# Patient Record
Sex: MALE | Race: WHITE | NOT HISPANIC OR LATINO | Employment: FULL TIME | URBAN - METROPOLITAN AREA
[De-identification: names, ages, dates, MRNs, and addresses within clinical notes are randomized per-mention and may not be internally consistent; named-entity substitution may affect disease eponyms.]

---

## 2017-02-13 ENCOUNTER — OFFICE VISIT (OUTPATIENT)
Dept: FAMILY MEDICINE CLINIC | Facility: CLINIC | Age: 42
End: 2017-02-13

## 2017-02-13 VITALS
SYSTOLIC BLOOD PRESSURE: 120 MMHG | BODY MASS INDEX: 27.49 KG/M2 | RESPIRATION RATE: 16 BRPM | WEIGHT: 192 LBS | DIASTOLIC BLOOD PRESSURE: 80 MMHG | HEIGHT: 70 IN | HEART RATE: 67 BPM | TEMPERATURE: 97.5 F | OXYGEN SATURATION: 97 %

## 2017-02-13 DIAGNOSIS — K21.9 GASTROESOPHAGEAL REFLUX DISEASE WITHOUT ESOPHAGITIS: ICD-10-CM

## 2017-02-13 DIAGNOSIS — M25.561 CHRONIC PAIN OF RIGHT KNEE: ICD-10-CM

## 2017-02-13 DIAGNOSIS — R79.89 LFT ELEVATION: ICD-10-CM

## 2017-02-13 DIAGNOSIS — G89.29 CHRONIC PAIN OF RIGHT KNEE: ICD-10-CM

## 2017-02-13 DIAGNOSIS — R73.01 IMPAIRED FASTING BLOOD SUGAR: ICD-10-CM

## 2017-02-13 DIAGNOSIS — F41.1 GENERALIZED ANXIETY DISORDER: Primary | ICD-10-CM

## 2017-02-13 LAB
ALBUMIN SERPL-MCNC: 4.7 G/DL (ref 3.5–5.2)
ALBUMIN/GLOB SERPL: 1.7 G/DL
ALP SERPL-CCNC: 50 U/L (ref 39–117)
ALT SERPL-CCNC: 47 U/L (ref 1–41)
AST SERPL-CCNC: 31 U/L (ref 1–40)
BILIRUB SERPL-MCNC: 0.6 MG/DL (ref 0.1–1.2)
BUN SERPL-MCNC: 18 MG/DL (ref 6–20)
BUN/CREAT SERPL: 20 (ref 7–25)
CALCIUM SERPL-MCNC: 10 MG/DL (ref 8.6–10.5)
CHLORIDE SERPL-SCNC: 98 MMOL/L (ref 98–107)
CO2 SERPL-SCNC: 27.5 MMOL/L (ref 22–29)
CREAT SERPL-MCNC: 0.9 MG/DL (ref 0.76–1.27)
GLOBULIN SER CALC-MCNC: 2.8 GM/DL
GLUCOSE SERPL-MCNC: 103 MG/DL (ref 65–99)
POTASSIUM SERPL-SCNC: 4.5 MMOL/L (ref 3.5–5.2)
PROT SERPL-MCNC: 7.5 G/DL (ref 6–8.5)
SODIUM SERPL-SCNC: 138 MMOL/L (ref 136–145)

## 2017-02-13 PROCEDURE — 99214 OFFICE O/P EST MOD 30 MIN: CPT | Performed by: PHYSICIAN ASSISTANT

## 2017-02-13 RX ORDER — MELOXICAM 15 MG/1
TABLET ORAL
Refills: 0 | COMMUNITY
Start: 2016-12-29 | End: 2017-02-13

## 2017-02-13 RX ORDER — CELECOXIB 200 MG/1
200 CAPSULE ORAL DAILY
Qty: 30 CAPSULE | Refills: 11 | Status: SHIPPED | OUTPATIENT
Start: 2017-02-13 | End: 2017-03-17

## 2017-02-13 RX ORDER — LANSOPRAZOLE 15 MG/1
15 CAPSULE, DELAYED RELEASE ORAL DAILY
COMMUNITY
End: 2017-12-27

## 2017-02-13 RX ORDER — ALPRAZOLAM 0.25 MG/1
0.25 TABLET ORAL 3 TIMES DAILY PRN
Qty: 30 TABLET | Refills: 2
Start: 2017-02-13 | End: 2019-04-02

## 2017-02-13 NOTE — PROGRESS NOTES
Subjective   Esa Lozano is a 41 y.o. male.     History of Present Illness   Esa Lozano male 41 y.o. who presents today for follow up of Anxiety and Panic Attacks.  He reports medication is working well, patient desires to continue on Rx, and needs refill. Onset of symptoms was approximately several years ago.  He denies current suicidal and homicidal ideation. Risk factors are lifestyle of multiple roles.  Previous treatment includes current Rx.  He complains of the following medication side effects: none.  The patient has had no previous counseling..  The patient has read and signed the [facility] Controlled Substance Contract.  I will continue to see patient for regular follow up appointments.  They are well controlled on their medication.  LARY has been reviewed by me and is updated every 3 months. The patient is aware of the potential for addiction and dependence.  Patient denies having a history of substance abuse or addiction to alcohol.  He does however, have a family history of addiction with his father.    He is intolerant to SSRIs.  I had him on Zoloft  He is on PPI and working    Maxalt does treat migraines.  Has 3-4 migraines a month and this is very effective.    He takes Mobic for right knee and ankle pain.  Stop NSAID if GI upset or any signs tarry or blood in stools  Long term use of NSAIDs can lead to heart disease and strokes, as well as GI bleeding/ulcers, and cause kidney disease.     I will need update labs.    Lab Results   Component Value Date    GLUCOSE 110 (H) 08/20/2015    BUN 18 10/05/2016    CREATININE 1.12 10/05/2016    EGFRIFNONA 73 10/05/2016    EGFRIFAFRI 88 10/05/2016    BCR 16.1 10/05/2016    CO2 28.4 10/05/2016    CALCIUM 10.2 10/05/2016    PROTENTOTREF 7.4 10/05/2016    ALBUMIN 4.90 10/05/2016    LABIL2 2.0 10/05/2016    AST 37 10/05/2016    ALT 56 (H) 10/05/2016     I will need to show renal functions.  He wants to try Celebrex d/t hx GI.  I will repeat LFTs  also.    Lipids were done 4 mos ago and no Rx.    The following portions of the patient's history were reviewed and updated as appropriate: allergies, current medications, past family history, past medical history, past social history, past surgical history and problem list.    Review of Systems   Constitutional: Negative for activity change, appetite change and unexpected weight change.   HENT: Negative for nosebleeds and trouble swallowing.    Eyes: Negative for pain and visual disturbance.   Respiratory: Negative for chest tightness, shortness of breath and wheezing.    Cardiovascular: Negative for chest pain and palpitations.   Gastrointestinal: Negative for abdominal pain and blood in stool.   Endocrine: Negative.    Genitourinary: Negative for difficulty urinating and hematuria.   Musculoskeletal: Negative for joint swelling.   Skin: Negative for color change and rash.   Allergic/Immunologic: Negative.    Neurological: Positive for headaches. Negative for syncope and speech difficulty.   Hematological: Negative for adenopathy.   Psychiatric/Behavioral: Negative for agitation and confusion. The patient is nervous/anxious.    All other systems reviewed and are negative.      Objective   Physical Exam   Constitutional: He is oriented to person, place, and time. He appears well-developed and well-nourished. No distress.   HENT:   Head: Normocephalic and atraumatic.   Eyes: Conjunctivae and EOM are normal. Pupils are equal, round, and reactive to light. Right eye exhibits no discharge. Left eye exhibits no discharge. No scleral icterus.   Neck: Normal range of motion. Neck supple. No tracheal deviation present. No thyromegaly present.   Cardiovascular: Normal rate, regular rhythm, normal heart sounds, intact distal pulses and normal pulses.  Exam reveals no gallop.    No murmur heard.  Pulmonary/Chest: Effort normal and breath sounds normal. No respiratory distress. He has no wheezes. He has no rales.    Musculoskeletal: Normal range of motion.   Lymphadenopathy:     He has no cervical adenopathy.   Neurological: He is alert and oriented to person, place, and time. He exhibits normal muscle tone. Coordination normal.   Skin: Skin is warm. No rash noted. No erythema. No pallor.   Psychiatric: He has a normal mood and affect. His behavior is normal. Judgment and thought content normal.   Nursing note and vitals reviewed.      Assessment/Plan   Problems Addressed this Visit        Digestive    GERD (gastroesophageal reflux disease)    Relevant Medications    lansoprazole (PREVACID) 15 MG capsule    Other Relevant Orders    Comprehensive Metabolic Panel       Musculoskeletal and Integument    Chronic pain of right knee    Relevant Orders    Comprehensive Metabolic Panel       Other    Generalized anxiety disorder - Primary    Relevant Orders    Comprehensive Metabolic Panel

## 2017-02-13 NOTE — PATIENT INSTRUCTIONS
Xanax is a controlled substance.  I only want you to take it as needed.  I do not want you to take it routinely.  Use the lowest effective dose.  It can be habit forming.  It can make you feel drowsy.   This could effect how you operate machinery, including a car.  Caution is advised.  I would avoid taking it and then driving.  Do not take this with alcohol.  Low glycemic index diet  Exercise 30 minutes most days of the week  Make sure you get results on any labs or tests we ordered today  We discussed medications and how to take them as prescribed  Sleep 6-8 hours each night if possible  If you have not signed up for [place] [facility], please activate your code ASAP from your After Visit Summary today    LDL goal <100  LDL goal if heart disease <70  HDL goal >60  Triglyceride goal <150  BP goal =<130/80  Fasting glucose <100    Stop NSAID if GI upset or any signs tarry or blood in stools

## 2017-02-14 ENCOUNTER — TELEPHONE (OUTPATIENT)
Dept: FAMILY MEDICINE CLINIC | Facility: CLINIC | Age: 42
End: 2017-02-14

## 2017-03-17 ENCOUNTER — TELEPHONE (OUTPATIENT)
Dept: FAMILY MEDICINE CLINIC | Facility: CLINIC | Age: 42
End: 2017-03-17

## 2017-03-17 RX ORDER — MELOXICAM 15 MG/1
15 TABLET ORAL DAILY
Qty: 30 TABLET | Refills: 5 | Status: SHIPPED | OUTPATIENT
Start: 2017-03-17 | End: 2017-04-17 | Stop reason: SDUPTHER

## 2017-03-17 NOTE — TELEPHONE ENCOUNTER
Pt states that the celebrex is not helping the pain like the mobic. He would like to go back on the mobic.

## 2017-04-17 RX ORDER — MELOXICAM 15 MG/1
TABLET ORAL
Qty: 90 TABLET | Refills: 0 | Status: SHIPPED | OUTPATIENT
Start: 2017-04-17 | End: 2017-07-19 | Stop reason: SDUPTHER

## 2017-05-14 ENCOUNTER — RESULTS ENCOUNTER (OUTPATIENT)
Dept: FAMILY MEDICINE CLINIC | Facility: CLINIC | Age: 42
End: 2017-05-14

## 2017-05-14 DIAGNOSIS — R73.01 IMPAIRED FASTING BLOOD SUGAR: ICD-10-CM

## 2017-05-14 DIAGNOSIS — R79.89 LFT ELEVATION: ICD-10-CM

## 2017-07-19 RX ORDER — MELOXICAM 15 MG/1
TABLET ORAL
Qty: 90 TABLET | Refills: 0 | Status: SHIPPED | OUTPATIENT
Start: 2017-07-19 | End: 2017-12-27 | Stop reason: SDUPTHER

## 2017-08-15 RX ORDER — MELOXICAM 15 MG/1
TABLET ORAL
Qty: 90 TABLET | Refills: 0 | Status: SHIPPED | OUTPATIENT
Start: 2017-08-15 | End: 2017-12-29

## 2017-09-08 ENCOUNTER — OFFICE (AMBULATORY)
Dept: URBAN - METROPOLITAN AREA CLINIC 75 | Facility: CLINIC | Age: 42
End: 2017-09-08

## 2017-09-08 VITALS — DIASTOLIC BLOOD PRESSURE: 80 MMHG | HEIGHT: 70 IN | WEIGHT: 201 LBS | SYSTOLIC BLOOD PRESSURE: 126 MMHG

## 2017-09-08 DIAGNOSIS — R19.5 OTHER FECAL ABNORMALITIES: ICD-10-CM

## 2017-09-08 DIAGNOSIS — K21.9 GASTRO-ESOPHAGEAL REFLUX DISEASE WITHOUT ESOPHAGITIS: ICD-10-CM

## 2017-09-08 DIAGNOSIS — F45.8 OTHER SOMATOFORM DISORDERS: ICD-10-CM

## 2017-09-08 PROCEDURE — 99213 OFFICE O/P EST LOW 20 MIN: CPT | Performed by: INTERNAL MEDICINE

## 2017-09-08 RX ORDER — LANSOPRAZOLE 30 MG/1
30 CAPSULE, DELAYED RELEASE PELLETS ORAL
Qty: 90 | Refills: 4 | Status: COMPLETED
End: 2024-06-13

## 2017-11-09 RX ORDER — RIZATRIPTAN BENZOATE 10 MG/1
TABLET ORAL
Qty: 12 TABLET | Refills: 0 | Status: SHIPPED | OUTPATIENT
Start: 2017-11-09 | End: 2017-12-29 | Stop reason: SDUPTHER

## 2017-12-12 ENCOUNTER — OFFICE VISIT (OUTPATIENT)
Dept: ORTHOPEDIC SURGERY | Facility: CLINIC | Age: 42
End: 2017-12-12

## 2017-12-12 VITALS — BODY MASS INDEX: 27.72 KG/M2 | TEMPERATURE: 98.3 F | HEIGHT: 70 IN | WEIGHT: 193.6 LBS

## 2017-12-12 DIAGNOSIS — M25.562 PATELLOFEMORAL ARTHRALGIA OF LEFT KNEE: ICD-10-CM

## 2017-12-12 DIAGNOSIS — M17.11 PATELLOFEMORAL ARTHRITIS OF RIGHT KNEE: ICD-10-CM

## 2017-12-12 DIAGNOSIS — M23.91 INTERNAL DERANGEMENT OF KNEE JOINT, RIGHT: Primary | ICD-10-CM

## 2017-12-12 DIAGNOSIS — M25.562 CHRONIC PAIN OF BOTH KNEES: ICD-10-CM

## 2017-12-12 DIAGNOSIS — G89.29 CHRONIC PAIN OF BOTH KNEES: ICD-10-CM

## 2017-12-12 DIAGNOSIS — M25.561 CHRONIC PAIN OF BOTH KNEES: ICD-10-CM

## 2017-12-12 PROCEDURE — 99203 OFFICE O/P NEW LOW 30 MIN: CPT | Performed by: ORTHOPAEDIC SURGERY

## 2017-12-12 PROCEDURE — 73564 X-RAY EXAM KNEE 4 OR MORE: CPT | Performed by: ORTHOPAEDIC SURGERY

## 2017-12-12 NOTE — PROGRESS NOTES
"Patient:  Esa Lozano is a 42 y.o. male    Chief Complaint/ Reason for Visit:    Chief Complaint   Patient presents with   • Left Knee - Establish Care   • Right Knee - Pain, Establish Care       HPI:  This pleasant gentleman, whom I know socially as a mutual friend of Logan Salcido, presents today complaining of a history of pain in both of his knees for at least the past 4 years.  The pain is more intense in the right knee than the left.  His pain is usually moderate in intensity after prolonged days on his feet.  He is an , and stands on ladders and also does kneeling, squatting, and crawling and has a fairly physically demanding job.  His pain is aching at rest and he has grinding sensations behind his kneecaps, right worse than left, and will occasionally have a sharp, stabbing pain towards the medial joint line of the right knee.  He also feels like the right knee occasionally locks.  He says if he drives from here to [place] and gets out of the car his knees are stiff, especially the right one.  He does not have a lot of pain in his knees when he first gets out of bed in the morning but rather has more pain and stiffness after long day on his feet.  He has had some pain radiating from the posterior medial right knee about intermediate up his posterior right thigh and also down the medial aspect of his lower right leg.    He has seen Dr. Arnett for treatment of his knees over the years, and has had gel one injections on about 3 different occasions at 6 month intervals.  He says that the gel one injection seemed to help him for about a week and then wears off.  He does not recall having had any steroid or cortisone injections into his right knee.  He has not had any injections in his left knee.    He says that often at night after a long day of work, his right knee especially feels \"puffy\" and he puts warmth or heat on his knee and that seems to help the pain.    He is not recently acutely injured " either of his knees that he can recall, but the pain seems to have especially escalated, particularly in the right knee, over the past 2-3 months.    He has taken Aleve, Advil, and/or aspirin episodically over time with varying degrees of limited relief of his discomfort, but he prefers to avoid oral medications if possible.  He particularly wants to avoid these medications due to history of reflux disease and the associated gastrointestinal irritation that these medications cause for him.      PMH:    Past Medical History:   Diagnosis Date   • Dysphagia    • SAKINA (generalized anxiety disorder)    • GERD (gastroesophageal reflux disease)    • H/O chest x-ray 08/20/2015    DIFFUSE HYPERINFLATION OLD CALCIFIED BENIGN GRANULOMATOUS DISEASE NO ACCUTE CHANGES, BUT SIGNS OF AIRWAY INFLAMATION 8/2015 NO ACTIVE DISEASE   • Hypercholesterolemia    • Migraine        PSH:    Past Surgical History:   Procedure Laterality Date   • HAND SURGERY  05/2011    1st digit left hand, accidently cut with a chain saw, 26 stitches   • TRANSESOPHAGEAL ECHOCARDIOGRAM (JERI)  08/21/2015    [facility]       Social Hx:    Social History     Social History   • Marital status:      Spouse name: N/A   • Number of children: N/A   • Years of education: N/A     Occupational History   • Not on file.     Social History Main Topics   • Smoking status: Former Smoker   • Smokeless tobacco: Never Used   • Alcohol use Yes      Comment: RARE   • Drug use: No   • Sexual activity: Defer     Other Topics Concern   • Not on file     Social History Narrative       Family Hx:    Family History   Problem Relation Age of Onset   • Alcohol abuse Other    • Arthritis Other    • Cancer Other    • Diabetes Other    • Migraines Other    • Diabetes Mother    • Migraines Mother        Meds:    Current Outpatient Prescriptions:   •  lansoprazole (PREVACID) 15 MG capsule, Take 15 mg by mouth Daily., Disp: , Rfl:   •  meloxicam (MOBIC) 15 MG tablet, TAKE 1  "TABLET BY MOUTH DAILY WITH FOOD FOR PAIN. STOP IF GI UPSET. REPLACES CELEBREX., Disp: 90 tablet, Rfl: 0  •  ALPRAZolam (XANAX) 0.25 MG tablet, Take 1 tablet by mouth 3 (Three) Times a Day As Needed for anxiety., Disp: 30 tablet, Rfl: 2  •  ibuprofen (ADVIL,MOTRIN) 200 MG tablet, Take 200 mg by mouth Every 6 (Six) Hours As Needed for mild pain (1-3)., Disp: , Rfl:   •  meloxicam (MOBIC) 15 MG tablet, TAKE 1 TABLET BY MOUTH DAILY WITH FOOD FOR PAIN. STOP IF GI UPSET. REPLACES CELEBREX., Disp: 90 tablet, Rfl: 0  •  rizatriptan (MAXALT) 10 MG tablet, TAKE 1 TABLET BY MOUTH ONE TIME AS NEEDED FOR MIGRAINE MAY REPEAT IN 2 HOURS IF NEEDED, Disp: 12 tablet, Rfl: 0    Allergies:  No Known Allergies    ROS:  Review of Systems   Constitutional: Negative for chills, fever and unexpected weight change.   HENT: Negative for trouble swallowing and voice change.    Eyes: Negative for visual disturbance.   Respiratory: Negative for cough and shortness of breath.    Cardiovascular: Negative for chest pain and leg swelling.   Gastrointestinal: Negative for abdominal pain, nausea and vomiting.   Endocrine: Negative for cold intolerance and heat intolerance.   Genitourinary: Negative for difficulty urinating, frequency and urgency.   Musculoskeletal: Positive for arthralgias and joint swelling.   Skin: Negative for rash and wound.   Allergic/Immunologic: Negative for immunocompromised state.   Neurological: Negative for weakness and numbness.   Hematological: Does not bruise/bleed easily.   Psychiatric/Behavioral: Negative for dysphoric mood. The patient is not nervous/anxious.        Vitals:    12/12/17 1005   Temp: 98.3 °F (36.8 °C)   TempSrc: Temporal Artery    Weight: 87.8 kg (193 lb 9.6 oz)   Height: 177.8 cm (70\")   Body mass index is 27.78 kg/(m^2).      Physical Exam    The patient is awake, alert, and oriented ×3.  The patient is in no acute distress.  Breathing is regular and unlabored with a respiratory rate of 12/m.  " Extraocular movements and pupillary responses are symmetrically intact. Sclerae are anicteric.   Hearing is within normal limits.  Speech is within normal limits.  There is no jugular venous distention.    He walks fairly well with no pronounced limp.  I do notice some mild atrophy of the right calf musculature compared to the left lower extremity.  This is subtle but definitely visible.  He has no spasticity, cogwheeling, or clonus.  He has no fasciculations in either lower extremity.  Motor strength is 5 over 5 throughout both lower extremities at this time to manual motor testing.    Right knee: The patient has a markedly positive bounce test.  Range of motion is 5° to 125°.  The right knee is stable in all planes.  The patient has medial and posterior medial joint line tenderness.  The patient has a positive right knee Esdras test.  The patient does have some mild crepitus on patella grind.  Hyperflexion test is also positive for reproduction of anterior and anteromedial pain in the right knee.  There is no pronounced effusion.  There is no discoloration or abnormal warmth of the right knee.  The skin is unremarkable.  The patient has no cellulitis, no lymphangitis, and no popliteal lymphadenopathy in the right lower extremity.    Left knee: Range of motion is 0° to 130° of flexion.  The left knee is stable in all planes.  The patient has mild crepitus on patella grind.  There is no effusion or pronounced tenderness.  The patient has mild discomfort on hyperflexion test.  Apprehension test is negative.  Bounce test and Esdras's test are negative.      Radiology: X-rays: A 4 view arthritis series of each knee was ordered and reviewed today to assess patient's complaints of chronic bilateral knee pain.  Comparison images were not immediately available.  The right knee shows some minor degenerative changes most notably in the form of patellofemoral spurring.  However, there is a subtle arcuate bone density  change in the medial femoral condyle suggestive of an osteochondral lesion.  I don't see any loose bodies.  The patient has minimal degenerative change in the left knee with some minor patellofemoral spurring noted.          Assessment:  1. Internal derangement of knee joint, right    - MRI Knee Right Without Contrast; Future    2. Chronic pain of both knees    - XR Knee 4+ View Bilateral    3. Patellofemoral arthritis of right knee      4. Patellofemoral arthralgia of left knee              Plan:  I discussed everything with the patient at length.  I recommended that he get some over-the-counter knee braces.  He has agreed.  However, his history and examination suggests the possibility of an internal derangement of the right knee, I suspect a posterior horn meniscus tear, as well as his images suggest a possible osteochondral lesion of the medial femoral condyle.  I think an MRI is appropriate to evaluate the significant abnormalities.  Patient voiced understanding and agreement and will follow-up once the MRI of the right knee has been completed and interpreted.  The patient will likely require physical therapy for both knees at some point.        Orders Placed This Encounter   Procedures   • XR Knee 4+ View Bilateral     Order Specific Question:   Reason for Exam:     Answer:   ANN MARIE KNEE PAIN   • MRI Knee Right Without Contrast     Standing Status:   Future     Standing Expiration Date:   12/12/2018     Order Specific Question:   Reason for Exam:     Answer:   Positive bounce test and history of locking.  Medial joint line tenderness.  Also, plain imaging suggest a possible medial femoral condyle osteochondral lesion.

## 2017-12-13 ENCOUNTER — TELEPHONE (OUTPATIENT)
Dept: ORTHOPEDIC SURGERY | Facility: CLINIC | Age: 42
End: 2017-12-13

## 2017-12-22 ENCOUNTER — HOSPITAL ENCOUNTER (OUTPATIENT)
Dept: MRI IMAGING | Facility: HOSPITAL | Age: 42
Discharge: HOME | End: 2017-12-22
Attending: ORTHOPAEDIC SURGERY | Admitting: ORTHOPAEDIC SURGERY

## 2017-12-22 DIAGNOSIS — M23.91 INTERNAL DERANGEMENT OF KNEE JOINT, RIGHT: ICD-10-CM

## 2017-12-22 PROCEDURE — 73721 MRI JNT OF LWR EXTRE W/O DYE: CPT

## 2017-12-27 ENCOUNTER — OFFICE VISIT (OUTPATIENT)
Dept: ORTHOPEDIC SURGERY | Facility: CLINIC | Age: 42
End: 2017-12-27

## 2017-12-27 VITALS
DIASTOLIC BLOOD PRESSURE: 84 MMHG | BODY MASS INDEX: 27.43 KG/M2 | TEMPERATURE: 96.8 F | WEIGHT: 191.6 LBS | SYSTOLIC BLOOD PRESSURE: 120 MMHG | HEIGHT: 70 IN

## 2017-12-27 DIAGNOSIS — S83.231A COMPLEX TEAR OF MEDIAL MENISCUS OF RIGHT KNEE AS CURRENT INJURY, INITIAL ENCOUNTER: Primary | ICD-10-CM

## 2017-12-27 PROCEDURE — 99214 OFFICE O/P EST MOD 30 MIN: CPT | Performed by: ORTHOPAEDIC SURGERY

## 2017-12-27 RX ORDER — LANSOPRAZOLE 30 MG/1
CAPSULE, DELAYED RELEASE ORAL
Refills: 11 | COMMUNITY
Start: 2017-12-04 | End: 2017-12-29 | Stop reason: SDUPTHER

## 2017-12-27 RX ORDER — HYDROCODONE BITARTRATE AND IBUPROFEN 7.5; 2 MG/1; MG/1
TABLET, FILM COATED ORAL
Qty: 60 TABLET | Refills: 0 | Status: SHIPPED | OUTPATIENT
Start: 2017-12-27 | End: 2018-02-09

## 2017-12-27 RX ORDER — CEFAZOLIN SODIUM 2 G/100ML
2 INJECTION, SOLUTION INTRAVENOUS ONCE
Status: CANCELLED | OUTPATIENT
Start: 2018-01-02 | End: 2017-12-27

## 2017-12-27 NOTE — PROGRESS NOTES
Patient:  Esa Lozano is a 42 y.o. male    Chief Complaint/ Reason for Visit:    Chief Complaint   Patient presents with   • Right Knee - Follow-up, Pain       HPI:        PMH:    Past Medical History:   Diagnosis Date   • Dysphagia    • SAKINA (generalized anxiety disorder)    • GERD (gastroesophageal reflux disease)    • H/O chest x-ray 08/20/2015    DIFFUSE HYPERINFLATION OLD CALCIFIED BENIGN GRANULOMATOUS DISEASE NO ACCUTE CHANGES, BUT SIGNS OF AIRWAY INFLAMATION 8/2015 NO ACTIVE DISEASE   • Hypercholesterolemia    • Migraine        PSH:    Past Surgical History:   Procedure Laterality Date   • HAND SURGERY  05/2011    1st digit left hand, accidently cut with a chain saw, 26 stitches   • TRANSESOPHAGEAL ECHOCARDIOGRAM (JERI)  08/21/2015    [facility]       Social Hx:    Social History     Social History   • Marital status:      Spouse name: N/A   • Number of children: N/A   • Years of education: N/A     Occupational History   • Not on file.     Social History Main Topics   • Smoking status: Former Smoker   • Smokeless tobacco: Never Used   • Alcohol use Yes      Comment: RARE   • Drug use: No   • Sexual activity: Defer     Other Topics Concern   • Not on file     Social History Narrative       Family Hx:    Family History   Problem Relation Age of Onset   • Alcohol abuse Other    • Arthritis Other    • Cancer Other    • Diabetes Other    • Migraines Other    • Diabetes Mother    • Migraines Mother        Meds:    Current Outpatient Prescriptions:   •  ALPRAZolam (XANAX) 0.25 MG tablet, Take 1 tablet by mouth 3 (Three) Times a Day As Needed for anxiety., Disp: 30 tablet, Rfl: 2  •  ibuprofen (ADVIL,MOTRIN) 200 MG tablet, Take 200 mg by mouth Every 6 (Six) Hours As Needed for mild pain (1-3)., Disp: , Rfl:   •  lansoprazole (PREVACID) 30 MG capsule, TK 1 C PO QD, Disp: , Rfl: 11  •  meloxicam (MOBIC) 15 MG tablet, TAKE 1 TABLET BY MOUTH DAILY WITH FOOD FOR PAIN. STOP IF GI UPSET. REPLACES  "CELEBREX., Disp: 90 tablet, Rfl: 0  •  rizatriptan (MAXALT) 10 MG tablet, TAKE 1 TABLET BY MOUTH ONE TIME AS NEEDED FOR MIGRAINE MAY REPEAT IN 2 HOURS IF NEEDED, Disp: 12 tablet, Rfl: 0    Allergies:  No Known Allergies    ROS:  Review of Systems    Vitals:    12/27/17 1238   BP: 120/84   BP Location: Left arm   Patient Position: Sitting   Temp: 96.8 °F (36 °C)   TempSrc: Temporal Artery    Weight: 86.9 kg (191 lb 9.6 oz)   Height: 177.8 cm (70\")     Body mass index is 27.49 kg/(m^2).    Physical Exam   Constitutional: He is oriented to person, place, and time. He appears well-developed and well-nourished. He is cooperative.   HENT:   Head: Normocephalic and atraumatic.   Mouth/Throat: Oropharynx is clear and moist.   Eyes: Conjunctivae and EOM are normal. Pupils are equal, round, and reactive to light. No scleral icterus.   Neck: Trachea normal and phonation normal. No JVD present. No tracheal deviation present. No thyromegaly present.   Cardiovascular: Normal rate, regular rhythm, normal heart sounds and intact distal pulses.    No murmur heard.  Pulses:       Dorsalis pedis pulses are 2+ on the right side, and 2+ on the left side.        Posterior tibial pulses are 2+ on the right side, and 2+ on the left side.   Pulmonary/Chest: Effort normal and breath sounds normal. No respiratory distress. He has no wheezes.   Musculoskeletal:   He walks fairly well with no pronounced limp.  I do notice some mild atrophy of the right calf musculature compared to the left lower extremity.  This is subtle but definitely visible.  He has no spasticity, cogwheeling, or clonus.  He has no fasciculations in either lower extremity.  Motor strength is 5 over 5 throughout both lower extremities at this time to manual motor testing.     Right knee: The patient has a markedly positive bounce test.  Range of motion is 5° to 125°.  The right knee is stable in all planes.  The patient has medial and posterior medial joint line tenderness.  " The patient has a positive right knee Esdras test.  The patient does have some mild crepitus on patella grind.  Hyperflexion test is also positive for reproduction of anterior and anteromedial pain in the right knee.  There is no pronounced effusion.  There is no discoloration or abnormal warmth of the right knee.  The skin is unremarkable.  The patient has no cellulitis, no lymphangitis, and no popliteal lymphadenopathy in the right lower extremity.     Left knee: Range of motion is 0° to 130° of flexion.  The left knee is stable in all planes.  The patient has mild crepitus on patella grind.  There is no effusion or pronounced tenderness.  The patient has mild discomfort on hyperflexion test.  Apprehension test is negative.  Bounce test and Esdras's test are negative.   Neurological: He is alert and oriented to person, place, and time.   Skin: Skin is warm and dry. No rash noted. No cyanosis or erythema. Nails show no clubbing.   Psychiatric: He has a normal mood and affect. His speech is normal and behavior is normal. Judgment and thought content normal.   Nursing note and vitals reviewed.        Radiology: MRI right knee: I reviewed the MRI images personally.  These images show the patient has some evidence of degenerative change in all 3 compartments.  Acutely, the patient has a tear in the posterior horn of the medial meniscus, as I suspected on history and physical exam at the prior visit.  We also had a comparison MRI of the right knee that was reviewed.  I did not see a meniscus tear on those images, though the evidence of his degenerative change was present.    I have also reviewed the radiologist's report for the MRI that we just obtained at [facility], as well as the radiologist's report from the MRI of July 2016.  My findings are essentially the same as those of the interpreting radiologists in both cases.          Assessment:     Diagnosis Plan   1. Complex tear of medial meniscus of right knee as  current injury, initial encounter  ceFAZolin (ANCEF) 2 g in sodium chloride 0.9 % 100 mL IVPB    Case Request    Case Request           Plan:  I reviewed the findings with the patient.  I went over the MRI report with him today, and I translated the findings into understandable terms.  I used a 3-dimensional model of the knee to discuss the various problems going on in his knee.  I explained the difference between the articular cartilage and the meniscus cartilage.  I explained that he did have some early arthritis in his knee, and advised him that there was nothing that could be done to improve or treat that with an arthroscopic procedure.  However, I did discuss with him the treatment options of the meniscus tear, both nonsurgical and surgical.  We reviewed the potential outcomes good and bad of treating a meniscus tear expectantly versus surgically.  He voiced understanding.    We discussed the options, both surgical and nonsurgical, and he voiced understanding.  We reviewed the potential outcomes good and bad of these options as well.  We reviewed that surgery had risks, and that surgery has no guarantees.  We discussed that the risks of surgery include, but are not limited to, bleeding, infection, injury to nerves, blood vessels, tendons, ligaments, or other soft tissue structures, persistent pain and/or swelling despite surgery, the need for future surgical procedures, skin wound problems or skin breakdown, blood clots, pulmonary embolism, pneumonia, stroke, heart attack, and even death due to these or other complications.  The patient understands that even with successful treatment of the pathology within his knee arthroscopically, there is no guarantee of a good outcome, and there is no protection offered from future problems or injuries.  We have reviewed, and he has voiced understanding that, there may be problems in the knee that are not amenable to arthroscopic treatment, and that these problems could be  responsible for ongoing, persistent, or even worsened symptoms within the knee including but not limited to pain, stiffness, limping, and swelling.  The patient has voiced understanding, questions were answered to the patient's complete satisfaction, and the patient wishes to proceed with surgical treatment.      Narcotic counseling was performed in the usual fashion, and I emphasized the risks of narcotic use and the dangers. We discussed the potential for dependency and addiction, and we discussed things to avoid when taking these medications including specific activities to avoid and other sedating substances including alcohol or other medications.     A Fritz report was checked, and the patient confirmed that he understood our detailed discussion regarding house bill 1 and the risks and pitfalls of narcotic use. Appropriate narcotic pain medication was issued.     The patient will have surgery in the near future at his request, and will need to follow-up about 10 days postoperatively.            Orders Placed This Encounter   Procedures   • Follow anesthesia standing orders.   • Obtain informed consent     Order Specific Question:   Informed Consent Given For     Answer:   Right knee arthroscopy     Order Specific Question:   Laterality     Answer:   Right

## 2017-12-29 ENCOUNTER — APPOINTMENT (OUTPATIENT)
Dept: PREADMISSION TESTING | Facility: HOSPITAL | Age: 42
End: 2017-12-29

## 2017-12-29 VITALS
DIASTOLIC BLOOD PRESSURE: 80 MMHG | TEMPERATURE: 97.9 F | WEIGHT: 190 LBS | HEART RATE: 75 BPM | RESPIRATION RATE: 16 BRPM | SYSTOLIC BLOOD PRESSURE: 131 MMHG | OXYGEN SATURATION: 98 % | BODY MASS INDEX: 27.2 KG/M2 | HEIGHT: 70 IN

## 2017-12-29 LAB
ANION GAP SERPL CALCULATED.3IONS-SCNC: 8.7 MMOL/L
BUN BLD-MCNC: 17 MG/DL (ref 6–20)
BUN/CREAT SERPL: 19.8 (ref 7–25)
CALCIUM SPEC-SCNC: 9.2 MG/DL (ref 8.6–10.5)
CHLORIDE SERPL-SCNC: 103 MMOL/L (ref 98–107)
CO2 SERPL-SCNC: 28.3 MMOL/L (ref 22–29)
CREAT BLD-MCNC: 0.86 MG/DL (ref 0.76–1.27)
DEPRECATED RDW RBC AUTO: 40.2 FL (ref 37–54)
ERYTHROCYTE [DISTWIDTH] IN BLOOD BY AUTOMATED COUNT: 12.1 % (ref 11.5–14.5)
GFR SERPL CREATININE-BSD FRML MDRD: 98 ML/MIN/1.73
GLUCOSE BLD-MCNC: 109 MG/DL (ref 65–99)
HCT VFR BLD AUTO: 43.7 % (ref 40.4–52.2)
HGB BLD-MCNC: 15.1 G/DL (ref 13.7–17.6)
MCH RBC QN AUTO: 31.3 PG (ref 27–32.7)
MCHC RBC AUTO-ENTMCNC: 34.6 G/DL (ref 32.6–36.4)
MCV RBC AUTO: 90.7 FL (ref 79.8–96.2)
PLATELET # BLD AUTO: 162 10*3/MM3 (ref 140–500)
PMV BLD AUTO: 10.2 FL (ref 6–12)
POTASSIUM BLD-SCNC: 4.9 MMOL/L (ref 3.5–5.2)
RBC # BLD AUTO: 4.82 10*6/MM3 (ref 4.6–6)
SODIUM BLD-SCNC: 140 MMOL/L (ref 136–145)
WBC NRBC COR # BLD: 4.94 10*3/MM3 (ref 4.5–10.7)

## 2017-12-29 PROCEDURE — 85027 COMPLETE CBC AUTOMATED: CPT | Performed by: ORTHOPAEDIC SURGERY

## 2017-12-29 PROCEDURE — 36415 COLL VENOUS BLD VENIPUNCTURE: CPT

## 2017-12-29 PROCEDURE — 80048 BASIC METABOLIC PNL TOTAL CA: CPT | Performed by: ORTHOPAEDIC SURGERY

## 2017-12-29 RX ORDER — MELOXICAM 15 MG/1
15 TABLET ORAL DAILY
COMMUNITY
End: 2018-01-02 | Stop reason: HOSPADM

## 2017-12-29 RX ORDER — RIZATRIPTAN BENZOATE 10 MG/1
10 TABLET ORAL ONCE AS NEEDED
COMMUNITY
End: 2019-04-02 | Stop reason: SDUPTHER

## 2017-12-29 RX ORDER — LANSOPRAZOLE 30 MG/1
30 CAPSULE, DELAYED RELEASE ORAL DAILY
COMMUNITY
End: 2019-04-02 | Stop reason: SDUPTHER

## 2017-12-29 NOTE — DISCHARGE INSTRUCTIONS
Take the following medications the morning of surgery with a small sip of water: LANSOPRAZOLE        General Instructions:  • Do not eat solid food after midnight the night before surgery.  • You may drink clear liquids day of surgery but must stop at least one hour before your hospital arrival time.  • It is beneficial for you to have a clear drink that contains carbohydrates the day of surgery.  We suggest a 12 to 20 ounce bottle of Gatorade or Powerade for non-diabetic patients or a 12 to 20 ounce bottle of G2 or Powerade Zero for diabetic patients.     Clear liquids are liquids you can see through.  Nothing red in color.     Plain water                               Sports drinks  Sodas                                   Gelatin (Jell-O)  Fruit juices without pulp such as white grape juice and apple juice  Popsicles that contain no fruit or yogurt  Tea or coffee (no cream or milk added)  Gatorade / Powerade  G2 / Powerade Zero    • Patients who avoid smoking, chewing tobacco and alcohol for 4 weeks prior to surgery have a reduced risk of post-operative complications.  Quit smoking as many days before surgery as you can.  • Do not smoke, use chewing tobacco or drink alcohol the day of surgery.   • Bring any papers given to you in the doctor’s office.  • Wear clean comfortable clothes and socks.  • Do not wear contact lenses or make-up.  Bring a case for your glasses.   • Bring crutches or walker if applicable.  • Remove all piercings.  Leave jewelry and any other valuables at home.  • Hair extensions with metal clips must be removed prior to surgery.  • The Pre-Admission Testing nurse will instruct you to bring medications if unable to obtain an accurate list in Pre-Admission Testing.            Preventing a Surgical Site Infection:  • For 2 to 3 days before surgery, avoid shaving with a razor because the razor can irritate skin and make it easier to develop an infection.  • The night prior to surgery sleep in a  clean bed with clean clothing.  Do not allow pets to sleep with you.  • Shower on the morning of surgery using a fresh bar of anti-bacterial soap (such as Dial) and clean washcloth.  Dry with a clean towel and dress in clean clothing.  • Ask your surgeon if you will be receiving antibiotics prior to surgery.  • Make sure you, your family, and all healthcare providers clean their hands with soap and water or an alcohol based hand  before caring for you or your wound.    Day of surgery: 1/2/2018. [facility] 1100  Upon arrival, a Pre-op nurse and Anesthesiologist will review your health history, obtain vital signs, and answer questions you may have.  The only belongings needed at this time will be your home medications and if applicable your C-PAP/BI-PAP machine.  If you are staying overnight your family can leave the rest of your belongings in the car and bring them to your room later.  A Pre-op nurse will start an IV and you may receive medication in preparation for surgery, including something to help you relax.  Your family will be able to see you in the Pre-op area.  While you are in surgery your family should notify the waiting room  if they leave the waiting room area and provide a contact phone number.    Please be aware that surgery does come with discomfort.  We want to make every effort to control your discomfort so please discuss any uncontrolled symptoms with your nurse.   Your doctor will most likely have prescribed pain medications.      If you are going home after surgery you will receive individualized written care instructions before being discharged.  A responsible adult must drive you to and from the hospital on the day of your surgery and stay with you for 24 hours.        If you have any questions please call Pre-Admission Testing at [phone].  Deductibles and co-payments are collected on the day of service. Please be prepared to pay the required co-pay, deductible or  deposit on the day of service as defined by your plan.

## 2018-01-02 ENCOUNTER — ANESTHESIA EVENT (OUTPATIENT)
Dept: PERIOP | Facility: HOSPITAL | Age: 43
End: 2018-01-02

## 2018-01-02 ENCOUNTER — HOSPITAL ENCOUNTER (OUTPATIENT)
Facility: HOSPITAL | Age: 43
Setting detail: HOSPITAL OUTPATIENT SURGERY
Discharge: HOME | End: 2018-01-02
Attending: ORTHOPAEDIC SURGERY | Admitting: ORTHOPAEDIC SURGERY

## 2018-01-02 ENCOUNTER — ANESTHESIA (OUTPATIENT)
Dept: PERIOP | Facility: HOSPITAL | Age: 43
End: 2018-01-02

## 2018-01-02 VITALS
HEART RATE: 67 BPM | SYSTOLIC BLOOD PRESSURE: 129 MMHG | OXYGEN SATURATION: 100 % | DIASTOLIC BLOOD PRESSURE: 91 MMHG | BODY MASS INDEX: 27.58 KG/M2 | RESPIRATION RATE: 16 BRPM | TEMPERATURE: 97.2 F | WEIGHT: 192.19 LBS

## 2018-01-02 DIAGNOSIS — S83.231A COMPLEX TEAR OF MEDIAL MENISCUS OF RIGHT KNEE AS CURRENT INJURY, INITIAL ENCOUNTER: ICD-10-CM

## 2018-01-02 PROCEDURE — G0289 ARTHRO, LOOSE BODY + CHONDRO: HCPCS | Performed by: ORTHOPAEDIC SURGERY

## 2018-01-02 PROCEDURE — 25010000003 CEFAZOLIN IN DEXTROSE 2-4 GM/100ML-% SOLUTION: Performed by: ORTHOPAEDIC SURGERY

## 2018-01-02 PROCEDURE — 29881 ARTHRS KNE SRG MNISECTMY M/L: CPT | Performed by: ORTHOPAEDIC SURGERY

## 2018-01-02 PROCEDURE — 25010000002 PROPOFOL 10 MG/ML EMULSION: Performed by: NURSE ANESTHETIST, CERTIFIED REGISTERED

## 2018-01-02 PROCEDURE — 29879 ARTHRS KNE SRG ABRASJ ARTHRP: CPT | Performed by: ORTHOPAEDIC SURGERY

## 2018-01-02 PROCEDURE — 25010000002 ONDANSETRON PER 1 MG: Performed by: NURSE ANESTHETIST, CERTIFIED REGISTERED

## 2018-01-02 PROCEDURE — 25010000002 FENTANYL CITRATE (PF) 100 MCG/2ML SOLUTION: Performed by: ANESTHESIOLOGY

## 2018-01-02 PROCEDURE — 25010000002 MIDAZOLAM PER 1 MG: Performed by: ANESTHESIOLOGY

## 2018-01-02 PROCEDURE — 25010000002 KETOROLAC TROMETHAMINE PER 15 MG: Performed by: NURSE ANESTHETIST, CERTIFIED REGISTERED

## 2018-01-02 RX ORDER — FENTANYL CITRATE 50 UG/ML
100 INJECTION, SOLUTION INTRAMUSCULAR; INTRAVENOUS
Status: DISCONTINUED | OUTPATIENT
Start: 2018-01-02 | End: 2018-01-02 | Stop reason: HOSPADM

## 2018-01-02 RX ORDER — LABETALOL HYDROCHLORIDE 5 MG/ML
5 INJECTION, SOLUTION INTRAVENOUS
Status: DISCONTINUED | OUTPATIENT
Start: 2018-01-02 | End: 2018-01-02 | Stop reason: HOSPADM

## 2018-01-02 RX ORDER — KETOROLAC TROMETHAMINE 30 MG/ML
INJECTION, SOLUTION INTRAMUSCULAR; INTRAVENOUS AS NEEDED
Status: DISCONTINUED | OUTPATIENT
Start: 2018-01-02 | End: 2018-01-02 | Stop reason: SURG

## 2018-01-02 RX ORDER — FAMOTIDINE 10 MG/ML
20 INJECTION, SOLUTION INTRAVENOUS ONCE
Status: COMPLETED | OUTPATIENT
Start: 2018-01-02 | End: 2018-01-02

## 2018-01-02 RX ORDER — DIPHENHYDRAMINE HYDROCHLORIDE 50 MG/ML
6.25 INJECTION INTRAMUSCULAR; INTRAVENOUS
Status: DISCONTINUED | OUTPATIENT
Start: 2018-01-02 | End: 2018-01-02 | Stop reason: HOSPADM

## 2018-01-02 RX ORDER — CEFAZOLIN SODIUM 2 G/100ML
2 INJECTION, SOLUTION INTRAVENOUS ONCE
Status: COMPLETED | OUTPATIENT
Start: 2018-01-02 | End: 2018-01-02

## 2018-01-02 RX ORDER — LIDOCAINE HYDROCHLORIDE 10 MG/ML
0.5 INJECTION, SOLUTION EPIDURAL; INFILTRATION; INTRACAUDAL; PERINEURAL ONCE AS NEEDED
Status: DISCONTINUED | OUTPATIENT
Start: 2018-01-02 | End: 2018-01-02 | Stop reason: HOSPADM

## 2018-01-02 RX ORDER — EPHEDRINE SULFATE 50 MG/ML
5 INJECTION, SOLUTION INTRAVENOUS ONCE AS NEEDED
Status: DISCONTINUED | OUTPATIENT
Start: 2018-01-02 | End: 2018-01-02 | Stop reason: HOSPADM

## 2018-01-02 RX ORDER — SODIUM CHLORIDE 0.9 % (FLUSH) 0.9 %
1-10 SYRINGE (ML) INJECTION AS NEEDED
Status: DISCONTINUED | OUTPATIENT
Start: 2018-01-02 | End: 2018-01-02 | Stop reason: HOSPADM

## 2018-01-02 RX ORDER — MIDAZOLAM HYDROCHLORIDE 1 MG/ML
1 INJECTION INTRAMUSCULAR; INTRAVENOUS
Status: DISCONTINUED | OUTPATIENT
Start: 2018-01-02 | End: 2018-01-02 | Stop reason: HOSPADM

## 2018-01-02 RX ORDER — PROPOFOL 10 MG/ML
VIAL (ML) INTRAVENOUS AS NEEDED
Status: DISCONTINUED | OUTPATIENT
Start: 2018-01-02 | End: 2018-01-02 | Stop reason: SURG

## 2018-01-02 RX ORDER — HYDROCODONE BITARTRATE AND ACETAMINOPHEN 7.5; 325 MG/1; MG/1
1 TABLET ORAL ONCE AS NEEDED
Status: DISCONTINUED | OUTPATIENT
Start: 2018-01-02 | End: 2018-01-02 | Stop reason: HOSPADM

## 2018-01-02 RX ORDER — OXYCODONE HYDROCHLORIDE AND ACETAMINOPHEN 5; 325 MG/1; MG/1
2 TABLET ORAL ONCE AS NEEDED
Status: DISCONTINUED | OUTPATIENT
Start: 2018-01-02 | End: 2018-01-02 | Stop reason: HOSPADM

## 2018-01-02 RX ORDER — ONDANSETRON 2 MG/ML
4 INJECTION INTRAMUSCULAR; INTRAVENOUS ONCE AS NEEDED
Status: DISCONTINUED | OUTPATIENT
Start: 2018-01-02 | End: 2018-01-02 | Stop reason: HOSPADM

## 2018-01-02 RX ORDER — MIDAZOLAM HYDROCHLORIDE 1 MG/ML
2 INJECTION INTRAMUSCULAR; INTRAVENOUS
Status: DISCONTINUED | OUTPATIENT
Start: 2018-01-02 | End: 2018-01-02 | Stop reason: HOSPADM

## 2018-01-02 RX ORDER — HYDROCODONE BITARTRATE AND ACETAMINOPHEN 7.5; 325 MG/1; MG/1
1 TABLET ORAL ONCE AS NEEDED
Status: COMPLETED | OUTPATIENT
Start: 2018-01-02 | End: 2018-01-02

## 2018-01-02 RX ORDER — FENTANYL CITRATE 50 UG/ML
50 INJECTION, SOLUTION INTRAMUSCULAR; INTRAVENOUS
Status: DISCONTINUED | OUTPATIENT
Start: 2018-01-02 | End: 2018-01-02 | Stop reason: HOSPADM

## 2018-01-02 RX ORDER — SODIUM CHLORIDE, SODIUM LACTATE, POTASSIUM CHLORIDE, CALCIUM CHLORIDE 600; 310; 30; 20 MG/100ML; MG/100ML; MG/100ML; MG/100ML
9 INJECTION, SOLUTION INTRAVENOUS CONTINUOUS
Status: DISCONTINUED | OUTPATIENT
Start: 2018-01-02 | End: 2018-01-02 | Stop reason: HOSPADM

## 2018-01-02 RX ORDER — SODIUM CHLORIDE, SODIUM LACTATE, POTASSIUM CHLORIDE, AND CALCIUM CHLORIDE .6; .31; .03; .02 G/100ML; G/100ML; G/100ML; G/100ML
IRRIGANT IRRIGATION AS NEEDED
Status: DISCONTINUED | OUTPATIENT
Start: 2018-01-02 | End: 2018-01-02 | Stop reason: HOSPADM

## 2018-01-02 RX ORDER — ONDANSETRON 2 MG/ML
INJECTION INTRAMUSCULAR; INTRAVENOUS AS NEEDED
Status: DISCONTINUED | OUTPATIENT
Start: 2018-01-02 | End: 2018-01-02 | Stop reason: SURG

## 2018-01-02 RX ORDER — FLUMAZENIL 0.1 MG/ML
0.2 INJECTION INTRAVENOUS AS NEEDED
Status: DISCONTINUED | OUTPATIENT
Start: 2018-01-02 | End: 2018-01-02 | Stop reason: HOSPADM

## 2018-01-02 RX ORDER — BUPIVACAINE HYDROCHLORIDE AND EPINEPHRINE 5; 5 MG/ML; UG/ML
INJECTION, SOLUTION PERINEURAL AS NEEDED
Status: DISCONTINUED | OUTPATIENT
Start: 2018-01-02 | End: 2018-01-02 | Stop reason: HOSPADM

## 2018-01-02 RX ORDER — HYDRALAZINE HYDROCHLORIDE 20 MG/ML
5 INJECTION INTRAMUSCULAR; INTRAVENOUS
Status: DISCONTINUED | OUTPATIENT
Start: 2018-01-02 | End: 2018-01-02 | Stop reason: HOSPADM

## 2018-01-02 RX ORDER — LIDOCAINE HYDROCHLORIDE 20 MG/ML
INJECTION, SOLUTION INFILTRATION; PERINEURAL AS NEEDED
Status: DISCONTINUED | OUTPATIENT
Start: 2018-01-02 | End: 2018-01-02 | Stop reason: SURG

## 2018-01-02 RX ADMIN — Medication 2 MG: at 11:56

## 2018-01-02 RX ADMIN — FENTANYL CITRATE 50 MCG: 50 INJECTION INTRAMUSCULAR; INTRAVENOUS at 13:48

## 2018-01-02 RX ADMIN — CEFAZOLIN SODIUM 2 G: 2 INJECTION, SOLUTION INTRAVENOUS at 13:11

## 2018-01-02 RX ADMIN — KETOROLAC TROMETHAMINE 30 MG: 30 INJECTION, SOLUTION INTRAMUSCULAR; INTRAVENOUS at 13:22

## 2018-01-02 RX ADMIN — HYDROCODONE BITARTRATE AND ACETAMINOPHEN 1 TABLET: 7.5; 325 TABLET ORAL at 15:34

## 2018-01-02 RX ADMIN — FAMOTIDINE 20 MG: 10 INJECTION, SOLUTION INTRAVENOUS at 11:55

## 2018-01-02 RX ADMIN — LIDOCAINE HYDROCHLORIDE 40 MG: 20 INJECTION, SOLUTION INFILTRATION; PERINEURAL at 13:11

## 2018-01-02 RX ADMIN — SODIUM CHLORIDE, POTASSIUM CHLORIDE, SODIUM LACTATE AND CALCIUM CHLORIDE 9 ML/HR: 600; 310; 30; 20 INJECTION, SOLUTION INTRAVENOUS at 11:49

## 2018-01-02 RX ADMIN — FENTANYL CITRATE 50 MCG: 50 INJECTION INTRAMUSCULAR; INTRAVENOUS at 13:09

## 2018-01-02 RX ADMIN — FENTANYL CITRATE 50 MCG: 50 INJECTION INTRAMUSCULAR; INTRAVENOUS at 13:18

## 2018-01-02 RX ADMIN — PROPOFOL 200 MG: 10 INJECTION, EMULSION INTRAVENOUS at 13:11

## 2018-01-02 RX ADMIN — FENTANYL CITRATE 50 MCG: 50 INJECTION INTRAMUSCULAR; INTRAVENOUS at 14:32

## 2018-01-02 RX ADMIN — ONDANSETRON 4 MG: 2 INJECTION INTRAMUSCULAR; INTRAVENOUS at 13:22

## 2018-01-02 NOTE — ANESTHESIA POSTPROCEDURE EVALUATION
Patient: Esa Lozano    Procedure Summary     Date Anesthesia Start Anesthesia Stop Room / Location    01/02/18 [id number]  [facility]  [facility]  [facility]       Procedure Diagnosis Surgeon Provider    RIGHT KNEE ARTHROSCOPY, PARTIAL MEDIAL MENISECTOMY, EXTENSIVE CHONDROPLASTY AND MICRO FRACTURE, REMOVAL OF 8 LOOSE BODIES  (Right Knee) Complex tear of medial meniscus of right knee as current injury, initial encounter  (Complex tear of medial meniscus of right knee as current injury, initial encounter [S83.231A]) MD Boris Bueno MD          Anesthesia Type: general  Last vitals  BP   132/84 (01/02/18 1544)   Temp   36.2 °C (97.2 °F) (01/02/18 1448)   Pulse   69 (01/02/18 1544)   Resp   16 (01/02/18 1544)     SpO2   100 % (01/02/18 1544)     Post Anesthesia Care and Evaluation    Patient location during evaluation: bedside  Patient participation: complete - patient participated  Level of consciousness: awake  Pain score: 1  Pain management: adequate  Airway patency: patent  Anesthetic complications: No anesthetic complications    Cardiovascular status: acceptable  Respiratory status: acceptable  Hydration status: acceptable    Comments: --------------------            01/02/18 1544     --------------------   BP:       132/84     Pulse:      69       Resp:       16       Temp:                SpO2:      100%     --------------------

## 2018-01-02 NOTE — H&P (VIEW-ONLY)
Patient:  Esa Lozano is a 42 y.o. male    Chief Complaint/ Reason for Visit:    Chief Complaint   Patient presents with   • Right Knee - Follow-up, Pain       HPI:        PMH:    Past Medical History:   Diagnosis Date   • Dysphagia    • SAKINA (generalized anxiety disorder)    • GERD (gastroesophageal reflux disease)    • H/O chest x-ray 08/20/2015    DIFFUSE HYPERINFLATION OLD CALCIFIED BENIGN GRANULOMATOUS DISEASE NO ACCUTE CHANGES, BUT SIGNS OF AIRWAY INFLAMATION 8/2015 NO ACTIVE DISEASE   • Hypercholesterolemia    • Migraine        PSH:    Past Surgical History:   Procedure Laterality Date   • HAND SURGERY  05/2011    1st digit left hand, accidently cut with a chain saw, 26 stitches   • TRANSESOPHAGEAL ECHOCARDIOGRAM (JERI)  08/21/2015    [facility]       Social Hx:    Social History     Social History   • Marital status:      Spouse name: N/A   • Number of children: N/A   • Years of education: N/A     Occupational History   • Not on file.     Social History Main Topics   • Smoking status: Former Smoker   • Smokeless tobacco: Never Used   • Alcohol use Yes      Comment: RARE   • Drug use: No   • Sexual activity: Defer     Other Topics Concern   • Not on file     Social History Narrative       Family Hx:    Family History   Problem Relation Age of Onset   • Alcohol abuse Other    • Arthritis Other    • Cancer Other    • Diabetes Other    • Migraines Other    • Diabetes Mother    • Migraines Mother        Meds:    Current Outpatient Prescriptions:   •  ALPRAZolam (XANAX) 0.25 MG tablet, Take 1 tablet by mouth 3 (Three) Times a Day As Needed for anxiety., Disp: 30 tablet, Rfl: 2  •  ibuprofen (ADVIL,MOTRIN) 200 MG tablet, Take 200 mg by mouth Every 6 (Six) Hours As Needed for mild pain (1-3)., Disp: , Rfl:   •  lansoprazole (PREVACID) 30 MG capsule, TK 1 C PO QD, Disp: , Rfl: 11  •  meloxicam (MOBIC) 15 MG tablet, TAKE 1 TABLET BY MOUTH DAILY WITH FOOD FOR PAIN. STOP IF GI UPSET. REPLACES  "CELEBREX., Disp: 90 tablet, Rfl: 0  •  rizatriptan (MAXALT) 10 MG tablet, TAKE 1 TABLET BY MOUTH ONE TIME AS NEEDED FOR MIGRAINE MAY REPEAT IN 2 HOURS IF NEEDED, Disp: 12 tablet, Rfl: 0    Allergies:  No Known Allergies    ROS:  Review of Systems    Vitals:    12/27/17 1238   BP: 120/84   BP Location: Left arm   Patient Position: Sitting   Temp: 96.8 °F (36 °C)   TempSrc: Temporal Artery    Weight: 86.9 kg (191 lb 9.6 oz)   Height: 177.8 cm (70\")     Body mass index is 27.49 kg/(m^2).    Physical Exam   Constitutional: He is oriented to person, place, and time. He appears well-developed and well-nourished. He is cooperative.   HENT:   Head: Normocephalic and atraumatic.   Mouth/Throat: Oropharynx is clear and moist.   Eyes: Conjunctivae and EOM are normal. Pupils are equal, round, and reactive to light. No scleral icterus.   Neck: Trachea normal and phonation normal. No JVD present. No tracheal deviation present. No thyromegaly present.   Cardiovascular: Normal rate, regular rhythm, normal heart sounds and intact distal pulses.    No murmur heard.  Pulses:       Dorsalis pedis pulses are 2+ on the right side, and 2+ on the left side.        Posterior tibial pulses are 2+ on the right side, and 2+ on the left side.   Pulmonary/Chest: Effort normal and breath sounds normal. No respiratory distress. He has no wheezes.   Musculoskeletal:   He walks fairly well with no pronounced limp.  I do notice some mild atrophy of the right calf musculature compared to the left lower extremity.  This is subtle but definitely visible.  He has no spasticity, cogwheeling, or clonus.  He has no fasciculations in either lower extremity.  Motor strength is 5 over 5 throughout both lower extremities at this time to manual motor testing.     Right knee: The patient has a markedly positive bounce test.  Range of motion is 5° to 125°.  The right knee is stable in all planes.  The patient has medial and posterior medial joint line tenderness.  " The patient has a positive right knee Esdras test.  The patient does have some mild crepitus on patella grind.  Hyperflexion test is also positive for reproduction of anterior and anteromedial pain in the right knee.  There is no pronounced effusion.  There is no discoloration or abnormal warmth of the right knee.  The skin is unremarkable.  The patient has no cellulitis, no lymphangitis, and no popliteal lymphadenopathy in the right lower extremity.     Left knee: Range of motion is 0° to 130° of flexion.  The left knee is stable in all planes.  The patient has mild crepitus on patella grind.  There is no effusion or pronounced tenderness.  The patient has mild discomfort on hyperflexion test.  Apprehension test is negative.  Bounce test and Esdras's test are negative.   Neurological: He is alert and oriented to person, place, and time.   Skin: Skin is warm and dry. No rash noted. No cyanosis or erythema. Nails show no clubbing.   Psychiatric: He has a normal mood and affect. His speech is normal and behavior is normal. Judgment and thought content normal.   Nursing note and vitals reviewed.        Radiology: MRI right knee: I reviewed the MRI images personally.  These images show the patient has some evidence of degenerative change in all 3 compartments.  Acutely, the patient has a tear in the posterior horn of the medial meniscus, as I suspected on history and physical exam at the prior visit.  We also had a comparison MRI of the right knee that was reviewed.  I did not see a meniscus tear on those images, though the evidence of his degenerative change was present.    I have also reviewed the radiologist's report for the MRI that we just obtained at [facility], as well as the radiologist's report from the MRI of July 2016.  My findings are essentially the same as those of the interpreting radiologists in both cases.          Assessment:     Diagnosis Plan   1. Complex tear of medial meniscus of right knee as  current injury, initial encounter  ceFAZolin (ANCEF) 2 g in sodium chloride 0.9 % 100 mL IVPB    Case Request    Case Request           Plan:  I reviewed the findings with the patient.  I went over the MRI report with him today, and I translated the findings into understandable terms.  I used a 3-dimensional model of the knee to discuss the various problems going on in his knee.  I explained the difference between the articular cartilage and the meniscus cartilage.  I explained that he did have some early arthritis in his knee, and advised him that there was nothing that could be done to improve or treat that with an arthroscopic procedure.  However, I did discuss with him the treatment options of the meniscus tear, both nonsurgical and surgical.  We reviewed the potential outcomes good and bad of treating a meniscus tear expectantly versus surgically.  He voiced understanding.    We discussed the options, both surgical and nonsurgical, and he voiced understanding.  We reviewed the potential outcomes good and bad of these options as well.  We reviewed that surgery had risks, and that surgery has no guarantees.  We discussed that the risks of surgery include, but are not limited to, bleeding, infection, injury to nerves, blood vessels, tendons, ligaments, or other soft tissue structures, persistent pain and/or swelling despite surgery, the need for future surgical procedures, skin wound problems or skin breakdown, blood clots, pulmonary embolism, pneumonia, stroke, heart attack, and even death due to these or other complications.  The patient understands that even with successful treatment of the pathology within his knee arthroscopically, there is no guarantee of a good outcome, and there is no protection offered from future problems or injuries.  We have reviewed, and he has voiced understanding that, there may be problems in the knee that are not amenable to arthroscopic treatment, and that these problems could be  responsible for ongoing, persistent, or even worsened symptoms within the knee including but not limited to pain, stiffness, limping, and swelling.  The patient has voiced understanding, questions were answered to the patient's complete satisfaction, and the patient wishes to proceed with surgical treatment.      Narcotic counseling was performed in the usual fashion, and I emphasized the risks of narcotic use and the dangers. We discussed the potential for dependency and addiction, and we discussed things to avoid when taking these medications including specific activities to avoid and other sedating substances including alcohol or other medications.     A Fritz report was checked, and the patient confirmed that he understood our detailed discussion regarding house bill 1 and the risks and pitfalls of narcotic use. Appropriate narcotic pain medication was issued.     The patient will have surgery in the near future at his request, and will need to follow-up about 10 days postoperatively.            Orders Placed This Encounter   Procedures   • Follow anesthesia standing orders.   • Obtain informed consent     Order Specific Question:   Informed Consent Given For     Answer:   Right knee arthroscopy     Order Specific Question:   Laterality     Answer:   Right

## 2018-01-02 NOTE — OP NOTE
KNEE ARTHROSCOPY  Progress Note    Esa Lozano  1/2/2018    Pre-op Diagnosis:   Complex tear of medial meniscus of right knee as current injury, initial encounter [S83.231A], chondromalacia right knee       Post-Op Diagnosis Codes:      1)  Complex tear of medial meniscus of right knee as current injury, initial encounter [S83.231A]   2)  8 loose bodies in the right knee   3)  extensive grade 4 chondromalacia involving all 3 compartments of the right knee     Procedure/CPT® Codes:      Procedure(s):  RIGHT KNEE ARTHROSCOPY WITH PARTIAL MEDIAL MENISECTOMY; EXTENSIVE CHONDROPLASTY AND MICRO FRACTURE REQUIRING AT LEAST TWENTY MINUTES OF ADDITIONAL TREATMENT TIME, INVOLVING BOTH THE MEDIAL AND THE LATERAL COMPARTMENTS; and REMOVAL OF 8 LOOSE BODIES     Surgeon(s):  Ihsan Fontanez MD    Anesthesia: General    Staff:   Circulator: Bridgett Richardson RN  Scrub Person: Hernandez Ness    Estimated Blood Loss: minimal    Urine Voided: * No values recorded between 1/2/2018  1:05 PM and 1/2/2018  2:47 PM *    Specimens:                None      Drains:           Findings: Unfortunately, this patient had some focal grade 4 chondromalacia with some adjacent 3 changes on the patella lateral facet.  This was not amenable to microfracture.  Accordingly, the patient had some grade 2 chondromalacia in the trochlear groove of the femur.  The patient had fairly extensive band of grade 4 chondromalacia with loose chondral flaps in the medial femoral condyle.  This was overlying the area of complex tearing in his medial meniscus and may have had a mechanical association.  The patient was also found to have fairly extensive grade 4 chondromalacia in the lateral compartment involving the lateral femoral condyle and the lateral tibial condyle.  The lateral meniscus, however, was intact.  As noted above, and likely spawned from these areas of grade 4 chondromalacia, I removed 8 loose bodies.  5 were removed with the suction shaver and 3 were  so large that they were removed with the graspers.  The largest of these loose bodies measured 15 mm x 11 mm x 2 mm.  The next one measured 10 mm x 8 mm x 2.5 mm.  The final loose body extracted with the graspers measured 9 mm x 8 mm x 2.5 mm in greatest extent.      Complications: None noted intraoperatively    Technique:  The patient was taken to the operating room and placed comfortably supine on the table.  I had initialed the correct knee after confirming everything with the patient in the holding area.  General anesthesia was induced without difficulty.  Appropriate IV antibiotics were administered.  The operative extremity was carefully positioned and a tourniquet was applied high on the thigh over a layer padding. It was carefully positioned in a well-padded leg ojeda, then prepped and draped in the usual sterile fashion as is standard for knee arthroscopy.    The planned portal sites were infiltrated in the deep subcutaneous tissues with 0.5 percent Marcaine with epinephrine.  Additional Marcaine was placed within the knee joint to assist with intraoperative hemostasis and pain control.  Portals were created with a small transverse incisions at the level of the joint line just medial and lateral to the patellar tendon.  The scope was introduced initially through the inferomedial portal, but was switched to the other side as necessary to ensure complete and thorough visualization of all pathology identified.  A systematic inspection of the entire knee joint was now carried out.  The above postoperative diagnoses and findings were identified.  The patient underwent partial medial meniscectomy using basket resectors, the motorized shaver, and the [employer] instrument where appropriate.  The resected area was tapered into the remaining meniscus to create a smooth transition.  I carefully visually palpated and reinspected the remaining meniscus, and did not identify any additional tearing.    Unfortunately,  the patient had a fairly extensive longitudinal band of grade 4 chondromalacia in the medial femoral condyle.  It was overlying the area of meniscal tearing, and I suspect that the meniscus tear and chondromalacia were aggravating each other.  The patient had loose chondral flaps in this area that I'm very judiciously stabilized to allow for microfracture with chondral picks.  As can be seen in the operative photographs, I used the chondral picks and is standard technique to create small defects in the subchondral bone such that neovascularization might occur.  I did see back bleeding from these areas.    Similarly, in the lateral femoral condyle, the patient had a fairly extensive area of grade 4 chondromalacia.  The more posterior aspect of this defect seemed to already be forming fibrocartilage.  For this reason, I performed microfracture only on the anterior portion.  The patient had a considerable and irregular area of grade 4 chondromalacia on the medial tibial condyle.  Given its extent and configuration, I did not think microfracture would be useful.    It should also be noted that when I return to the lateral compartment, I first noted the very large loose body that was ultimately extracted with the graspers.  I continued to carefully inspect the patient's knee and a systematic and fashion each time I treated an abnormality and felt that I had completed the procedure.  However, 2 more surveys, revealed additional large chondral loose bodies for a total of 3 that were extracted.    I now repeated a thorough and systematic inspection of the entire knee joint. I did not identify any additional surgically treatable abnormalities.  At this point the scope was withdrawn, and excess fluid was expressed through the cannula, which was then withdrawn.  The portal incisions were approximated with interrupted 4-0 nylon suture. Additional Marcaine was placed within the knee joint, with a total of 30 mL being used for  the case.  All sponge, needle, and equipment counts were reported to the surgeon by the operating room staff to be correct.  The skin was carefully cleaned with wet and dry sponges, and sterile dressings were gently applied.  The vascularity of the patient's toes was checked after the application of the dressing, and found to be excellent.  The patient was awakened from anesthesia without incident.    I had a long and detailed postoperative discussion with the patient's wife and informed her of my findings and my associated concerns.  All of her questions were answered her satisfaction.      Ihsan Fontanez MD     Date: 1/2/2018  Time: 3:20 PM

## 2018-01-02 NOTE — ANESTHESIA PROCEDURE NOTES
Airway  Urgency: elective    Airway not difficult    General Information and Staff    Patient location during procedure: OR  Anesthesiologist: MARQUITA RAMSEY  CRNA: SESAR GUIDO    Indications and Patient Condition  Indications for airway management: airway protection    Preoxygenated: yes  Mask difficulty assessment: 1 - vent by mask    Final Airway Details  Final airway type: supraglottic airway      Successful airway: unique  Size 4    Number of attempts at approach: 1    Additional Comments  Smooth IV/mask induction and placement of LMA. Atraumatic, lips/teeth/mouth intact, as preop. +ETCO2, bilateral breath sounds and equal.

## 2018-01-02 NOTE — PLAN OF CARE
Problem: Perioperative Period (Adult)  Goal: Signs and Symptoms of Listed Potential Problems Will be Absent or Manageable (Perioperative Period)  Outcome: Ongoing (interventions implemented as appropriate)   01/02/18 1201   Perioperative Period   Problems Assessed (Perioperative Period) all   Problems Present (Perioperative Period) none

## 2018-01-02 NOTE — BRIEF OP NOTE
KNEE ARTHROSCOPY  Progress Note    Esa Lozano  1/2/2018    Pre-op Diagnosis:   Complex tear of medial meniscus of right knee as current injury, initial encounter [S83.231A], chondromalacia right knee       Post-Op Diagnosis Codes:      1)  Complex tear of medial meniscus of right knee as current injury, initial encounter [S83.231A]   2)  8 loose bodies in the right knee   3)  extensive grade 4 chondromalacia involving all 3 compartments of the right knee     Procedure/CPT® Codes:      Procedure(s):  RIGHT KNEE ARTHROSCOPY, PARTIAL MEDIAL MENISECTOMY, EXTENSIVE CHONDROPLASTY AND MICRO FRACTURE, REMOVAL OF 8 LOOSE BODIES     Surgeon(s):  Ihsan Fontanez MD    Anesthesia: General    Staff:   Circulator: Bridgett Richardson RN  Scrub Person: Hernandez Ness    Estimated Blood Loss: minimal    Urine Voided: * No values recorded between 1/2/2018  1:05 PM and 1/2/2018  2:47 PM *    Specimens:                None      Drains:           Findings: Unfortunately, this patient had some focal grade 4 chondromalacia with some adjacent 3 changes on the patella lateral facet.  This was not amenable to microfracture.  Accordingly, the patient had some grade 2 chondromalacia in the trochlear groove of the femur.  The patient had fairly extensive band of grade 4 chondromalacia with loose chondral flaps in the medial femoral condyle.  This was overlying the area of complex tearing in his medial meniscus and may have had a mechanical association.  The patient was also found to have fairly extensive grade 4 chondromalacia in the lateral compartment involving the lateral femoral condyle and the lateral tibial condyle.  The lateral meniscus, however, was intact.  As noted above, and likely spawned from these areas of grade 4 chondromalacia, I removed 8 loose bodies.  5 were removed with the suction shaver and 3 were so large that they were removed with the graspers.    Complications: None noted intraoperatively      Ihsan Fontanez MD      Date: 1/2/2018  Time: 3:20 PM

## 2018-01-02 NOTE — PLAN OF CARE
Problem: Patient Care Overview (Adult)  Goal: Adult Individualization and Mutuality  Outcome: Ongoing (interventions implemented as appropriate)   01/02/18 1127   Individualization   Patient Specific Preferences pt goes by Mitchel

## 2018-01-02 NOTE — INTERVAL H&P NOTE
H&P reviewed. The patient was examined and there are no changes to the H&P.     Ihsan Fontanez MD

## 2018-01-02 NOTE — PLAN OF CARE
Problem: Patient Care Overview (Adult)  Goal: Discharge Needs Assessment  Outcome: Ongoing (interventions implemented as appropriate)   01/02/18 1201   Discharge Needs Assessment   Concerns To Be Addressed other (see comments)  (needs crutches)

## 2018-01-02 NOTE — ANESTHESIA PREPROCEDURE EVALUATION
Anesthesia Evaluation     Patient summary reviewed and Nursing notes reviewed   NPO Solid Status: > 8 hours       Airway   Mallampati: II  TM distance: >3 FB  Neck ROM: full  no difficulty expected and Narrow palate  Dental - normal exam     Pulmonary - negative pulmonary ROS and normal exam   Cardiovascular - negative cardio ROS and normal exam        Neuro/Psych- negative ROS  GI/Hepatic/Renal/Endo - negative ROS     Musculoskeletal (-) negative ROS    Abdominal  - normal exam   Substance History - negative use     OB/GYN negative ob/gyn ROS         Other                                                Anesthesia Plan    ASA 2     general     intravenous induction   Anesthetic plan and risks discussed with patient.    Plan discussed with CRNA.

## 2018-01-02 NOTE — PLAN OF CARE
Problem: Patient Care Overview (Adult)  Goal: Plan of Care Review  Outcome: Ongoing (interventions implemented as appropriate)   01/02/18 1125   Coping/Psychosocial Response Interventions   Plan Of Care Reviewed With patient;spouse   Patient Care Overview   Progress improving     Goal: Discharge Needs Assessment  Outcome: Ongoing (interventions implemented as appropriate)   01/02/18 1125   Discharge Needs Assessment   Concerns To Be Addressed no discharge needs identified   Equipment Needed After Discharge crutches

## 2018-01-02 NOTE — PLAN OF CARE
Problem: Perioperative Period (Adult)  Goal: Signs and Symptoms of Listed Potential Problems Will be Absent or Manageable (Perioperative Period)  Outcome: Ongoing (interventions implemented as appropriate)   01/02/18 1125   Perioperative Period   Problems Assessed (Perioperative Period) all   Problems Present (Perioperative Period) none

## 2018-01-12 ENCOUNTER — OFFICE VISIT (OUTPATIENT)
Dept: ORTHOPEDIC SURGERY | Facility: CLINIC | Age: 43
End: 2018-01-12

## 2018-01-12 VITALS — WEIGHT: 185 LBS | BODY MASS INDEX: 26.48 KG/M2 | HEIGHT: 70 IN | TEMPERATURE: 98.1 F

## 2018-01-12 DIAGNOSIS — M23.8X1 CHONDRAL DEFECT OF RIGHT PATELLA: ICD-10-CM

## 2018-01-12 DIAGNOSIS — Z98.890 STATUS POST ARTHROSCOPIC PARTIAL MEDIAL MENISCECTOMY: ICD-10-CM

## 2018-01-12 DIAGNOSIS — Z98.890 STATUS POST ARTHROSCOPY OF RIGHT KNEE: Primary | ICD-10-CM

## 2018-01-12 DIAGNOSIS — M23.8X1 CHONDRAL DEFECT OF CONDYLE OF RIGHT FEMUR: ICD-10-CM

## 2018-01-12 PROCEDURE — 99024 POSTOP FOLLOW-UP VISIT: CPT | Performed by: ORTHOPAEDIC SURGERY

## 2018-01-12 RX ORDER — MULTIPLE VITAMINS W/ MINERALS TAB 9MG-400MCG
1 TAB ORAL DAILY
COMMUNITY
End: 2021-04-12

## 2018-01-12 NOTE — PROGRESS NOTES
Chief Complaint   Patient presents with   • Right Knee - Follow-up, Pain, Post-op       HPI    Esa Lozano is a 42 y.o. male who is here for follow up Status post right knee arthroscopy on January 2.  Unfortunately, in addition to the meniscus tear, he was found to have grade 4 chondral lesions involving both the medial and lateral compartments.  He had unstable cartilage and multiple intra-articular cartilaginous loose bodies as well.  Of course all the loose bodies were removed, the unstable flaps were stabilized, and he underwent microfracture using chondral picks.    He is actually had very little discomfort.  He has had no fever, chills, sweats, or shakes.  He has had no calf pain, chest pain, or shortness of breath.  He has been using his crutches as instructed though he does say he has had a couple of stumbles where he is coming down forcefully on his right knee.  He did have some mild discomfort associated with this.  He has had no trouble with his incisions.    Vitals:    01/12/18 0833   Temp: 98.1 °F (36.7 °C)     On exam, he is awake, alert, and oriented ×3.  He is using his crutches.  His incisions look great.  He has no sign of infection.  He has most no swelling on the right knee.  There is no redness, bruising, or abnormal warmth.  His right calf is quite soft and nontender with no venous cord.  Homans sign is negative.  Pulses and sensory exam are normal distally in the right lower extremity.      Assessment:      Encounter Diagnoses   Name Primary?   • Status post arthroscopy of right knee Yes   • Chondral defect of condyle of right femur    • Chondral defect of right patella    • Status post arthroscopic partial medial meniscectomy          Plan:  I discussed everything with the patient at length.  We went over the intraoperative photographs I explained everything to him, correlating them to a 3-dimensional model of the knee and his own knee.  I explained that it tremendously concerning nature of  these chondral defects.  I explained the great importance of continuing to maintain nonweightbearing status for another 4 weeks.  We went over the microfracture that had been performed, and the reasons for this.  He understands that he is likely to develop arthritis at a relatively young age.    He will continue to use crutches for now and I'll see him back in 4 weeks for reexamination.  Activity recommendations, restrictions, and precautions were reviewed and discussed in great detail and the patient voiced understanding.

## 2018-02-09 ENCOUNTER — HOSPITAL ENCOUNTER (OUTPATIENT)
Dept: CARDIOLOGY | Facility: HOSPITAL | Age: 43
Discharge: HOME | End: 2018-02-09
Attending: ORTHOPAEDIC SURGERY | Admitting: ORTHOPAEDIC SURGERY

## 2018-02-09 ENCOUNTER — OFFICE VISIT (OUTPATIENT)
Dept: ORTHOPEDIC SURGERY | Facility: CLINIC | Age: 43
End: 2018-02-09

## 2018-02-09 VITALS — HEIGHT: 70 IN | WEIGHT: 185 LBS | TEMPERATURE: 98.2 F | BODY MASS INDEX: 26.48 KG/M2

## 2018-02-09 DIAGNOSIS — G89.29 CHRONIC PAIN OF RIGHT KNEE: ICD-10-CM

## 2018-02-09 DIAGNOSIS — M79.661 PAIN AND SWELLING OF RIGHT LOWER LEG: Primary | ICD-10-CM

## 2018-02-09 DIAGNOSIS — M79.89 PAIN AND SWELLING OF RIGHT LOWER LEG: Primary | ICD-10-CM

## 2018-02-09 DIAGNOSIS — M25.561 CHRONIC PAIN OF RIGHT KNEE: ICD-10-CM

## 2018-02-09 DIAGNOSIS — M79.661 PAIN AND SWELLING OF RIGHT LOWER LEG: ICD-10-CM

## 2018-02-09 DIAGNOSIS — Z98.890 STATUS POST ARTHROSCOPY OF RIGHT KNEE: ICD-10-CM

## 2018-02-09 DIAGNOSIS — M79.89 PAIN AND SWELLING OF RIGHT LOWER LEG: ICD-10-CM

## 2018-02-09 DIAGNOSIS — Z98.890 STATUS POST ARTHROSCOPIC PARTIAL MEDIAL MENISCECTOMY: ICD-10-CM

## 2018-02-09 LAB
BH CV LOWER VASCULAR LEFT COMMON FEMORAL AUGMENT: NORMAL
BH CV LOWER VASCULAR LEFT COMMON FEMORAL COMPETENT: NORMAL
BH CV LOWER VASCULAR LEFT COMMON FEMORAL COMPRESS: NORMAL
BH CV LOWER VASCULAR LEFT COMMON FEMORAL PHASIC: NORMAL
BH CV LOWER VASCULAR LEFT COMMON FEMORAL SPONT: NORMAL
BH CV LOWER VASCULAR RIGHT COMMON FEMORAL AUGMENT: NORMAL
BH CV LOWER VASCULAR RIGHT COMMON FEMORAL COMPETENT: NORMAL
BH CV LOWER VASCULAR RIGHT COMMON FEMORAL COMPRESS: NORMAL
BH CV LOWER VASCULAR RIGHT COMMON FEMORAL PHASIC: NORMAL
BH CV LOWER VASCULAR RIGHT COMMON FEMORAL SPONT: NORMAL
BH CV LOWER VASCULAR RIGHT DISTAL FEMORAL COMPRESS: NORMAL
BH CV LOWER VASCULAR RIGHT GASTRONEMIUS COMPRESS: NORMAL
BH CV LOWER VASCULAR RIGHT GREATER SAPH AK COMPRESS: NORMAL
BH CV LOWER VASCULAR RIGHT GREATER SAPH BK COMPRESS: NORMAL
BH CV LOWER VASCULAR RIGHT MID FEMORAL AUGMENT: NORMAL
BH CV LOWER VASCULAR RIGHT MID FEMORAL COMPETENT: NORMAL
BH CV LOWER VASCULAR RIGHT MID FEMORAL COMPRESS: NORMAL
BH CV LOWER VASCULAR RIGHT MID FEMORAL PHASIC: NORMAL
BH CV LOWER VASCULAR RIGHT MID FEMORAL SPONT: NORMAL
BH CV LOWER VASCULAR RIGHT PERONEAL COMPRESS: NORMAL
BH CV LOWER VASCULAR RIGHT POPLITEAL AUGMENT: NORMAL
BH CV LOWER VASCULAR RIGHT POPLITEAL COMPETENT: NORMAL
BH CV LOWER VASCULAR RIGHT POPLITEAL COMPRESS: NORMAL
BH CV LOWER VASCULAR RIGHT POPLITEAL PHASIC: NORMAL
BH CV LOWER VASCULAR RIGHT POPLITEAL SPONT: NORMAL
BH CV LOWER VASCULAR RIGHT POSTERIOR TIBIAL COMPRESS: NORMAL
BH CV LOWER VASCULAR RIGHT PROXIMAL FEMORAL COMPRESS: NORMAL
BH CV LOWER VASCULAR RIGHT SAPHENOFEMORAL JUNCTION AUGMENT: NORMAL
BH CV LOWER VASCULAR RIGHT SAPHENOFEMORAL JUNCTION COMPETENT: NORMAL
BH CV LOWER VASCULAR RIGHT SAPHENOFEMORAL JUNCTION COMPRESS: NORMAL
BH CV LOWER VASCULAR RIGHT SAPHENOFEMORAL JUNCTION PHASIC: NORMAL
BH CV LOWER VASCULAR RIGHT SAPHENOFEMORAL JUNCTION SPONT: NORMAL

## 2018-02-09 PROCEDURE — 93971 EXTREMITY STUDY: CPT

## 2018-02-09 PROCEDURE — 99024 POSTOP FOLLOW-UP VISIT: CPT | Performed by: ORTHOPAEDIC SURGERY

## 2018-02-09 RX ORDER — MELOXICAM 15 MG/1
7.5 TABLET ORAL DAILY
COMMUNITY
End: 2021-02-12

## 2018-02-09 NOTE — PROGRESS NOTES
"Patient:  Esa Lozano is a 42 y.o. male    Chief Complaint/ Reason for Visit:    Chief Complaint   Patient presents with   • Right Knee - Post-op, Pain       HPI:  Patient returns today for a scheduled postop check.  He is almost 6 weeks status post right knee arthroscopy with partial meniscectomy.  Unfortunately, he was found have significant degenerative change in all 3 compartments.  He required chondroplasty and microfracture in the medial and lateral compartments.  We have, accordingly, kept him on crutches.  He has mild pain.  He says he has been weightbearing little bit and noticed some mild discomfort when standing in the shower.  However he is not had any obvious swelling.  He's had no fevers or chills.  He has, however, had some pain tracking up the posterior aspect of his lower leg and calf and has noted some swelling in his lower leg.  He's had some posterior thigh pain as well that he said he thought was \"hamstring spasms\".  He is not had any chest pain, shortness of breath, or palpitations.      PMH:    Past Medical History:   Diagnosis Date   • Arthritis     KNEE, HANDS OSTEO   • SAKINA (generalized anxiety disorder)    • GERD (gastroesophageal reflux disease)    • H/O chest x-ray 08/20/2015    DIFFUSE HYPERINFLATION OLD CALCIFIED BENIGN GRANULOMATOUS DISEASE NO ACCUTE CHANGES, BUT SIGNS OF AIRWAY INFLAMATION 8/2015 NO ACTIVE DISEASE   • Hypercholesterolemia     NO MED'S   • Migraine    • Right knee pain        PSH:    Past Surgical History:   Procedure Laterality Date   • HAND SURGERY  05/2011    1st digit left hand, accidently cut with a chain saw, 26 stitches. LOCAL ONLY   • KNEE ARTHROSCOPY Right 1/2/2018    Procedure: RIGHT KNEE ARTHROSCOPY, PARTIAL MEDIAL MENISECTOMY, EXTENSIVE CHONDROPLASTY AND MICRO FRACTURE, REMOVAL OF 8 LOOSE BODIES ;  Surgeon: Ihsan Fontanez MD;  Location: [facility] OR [facility] [place];  Service:    • KNEE SURGERY Right 01/02/2018    arthroscopy   • TRANSESOPHAGEAL ECHOCARDIOGRAM (JERI)  " "08/21/2015    [facility]       Social Hx:    Social History     Social History   • Marital status:      Spouse name: N/A   • Number of children: N/A   • Years of education: N/A     Occupational History   • Not on file.     Social History Main Topics   • Smoking status: Former Smoker     Packs/day: 1.00     Years: 18.00     Types: Cigarettes   • Smokeless tobacco: Never Used      Comment: QUIT 13 YRS AGO   • Alcohol use Yes      Comment: RARE   • Drug use: No   • Sexual activity: Defer     Other Topics Concern   • Not on file     Social History Narrative       Family Hx:    Family History   Problem Relation Age of Onset   • Alcohol abuse Other    • Arthritis Other    • Cancer Other    • Diabetes Other    • Migraines Other    • Diabetes Mother    • Migraines Mother        Meds:    Current Outpatient Prescriptions:   •  lansoprazole (PREVACID) 30 MG capsule, Take 30 mg by mouth Daily., Disp: , Rfl:   •  meloxicam (MOBIC) 15 MG tablet, Take 7.5 mg by mouth Daily., Disp: , Rfl:   •  Multiple Vitamins-Minerals (MULTIVITAMIN WITH MINERALS) tablet tablet, Take 1 tablet by mouth Daily., Disp: , Rfl:   •  rizatriptan (MAXALT) 10 MG tablet, Take 10 mg by mouth 1 (One) Time As Needed for Migraine. May repeat in 2 hours if needed, Disp: , Rfl:   •  ALPRAZolam (XANAX) 0.25 MG tablet, Take 1 tablet by mouth 3 (Three) Times a Day As Needed for anxiety., Disp: 30 tablet, Rfl: 2    Allergies:    Allergies   Allergen Reactions   • Gluten Meal        ROS:  Review of Systems    Vitals:    02/09/18 0925   Temp: 98.2 °F (36.8 °C)   TempSrc: Temporal Artery    Weight: 83.9 kg (185 lb)   Height: 177.8 cm (70\")     Body mass index is 26.54 kg/(m^2).    Physical Exam    The patient is awake, alert, and oriented ×3.  The patient is in no acute distress.  Breathing is regular and unlabored with a respiratory rate of 12/m.  Extraocular movements and pupillary responses are symmetrically intact. Sclerae are anicteric.   Hearing " is within normal limits.  Speech is within normal limits.  There is no jugular venous distention.      Right knee: There is no sign of infection.  Incisions are well healed.  There is no effusion.  Range of motion is from full extension to 130° of flexion.  Right calf is soft but it is a little tender.  There is no venous cord there is mild lower leg swelling.  Distally he has mild swelling in his foot.  Pulses are intact and regular.            Assessment:     Diagnosis Plan   1. Pain and swelling of right lower leg  Duplex Venous Lower Extremity - Right CAR   2. Status post arthroscopy of right knee     3. Status post arthroscopic partial medial meniscectomy     4. Chronic pain of right knee             Plan:  I discussed everything with the patient at length.  He has been given physical therapy in the form of home exercise program.  Apparently he does not currently have any health insurance, and says he cannot afford pain for physical therapy.  I gave him a comprehensive program and its importance was emphasized.  He already has a knee brace he is going to use that as instructed.  Additional activity recommendations and precautions reviewed in detail and he voiced understanding.  His questions were all answered to his satisfaction.    I did convey my concerns that he may have a blood clot and he agreed to have a Doppler today as recommended.  If all goes well, I will see him back in about 6 weeks.  If he has a blood clot, of course, appropriate treatment will be initiated.

## 2018-04-16 ENCOUNTER — OFFICE VISIT (OUTPATIENT)
Dept: ORTHOPEDIC SURGERY | Facility: CLINIC | Age: 43
End: 2018-04-16

## 2018-04-16 VITALS — HEIGHT: 71 IN | TEMPERATURE: 98.6 F | BODY MASS INDEX: 27.13 KG/M2 | WEIGHT: 193.8 LBS

## 2018-04-16 DIAGNOSIS — M25.562 PATELLOFEMORAL ARTHRALGIA OF LEFT KNEE: ICD-10-CM

## 2018-04-16 DIAGNOSIS — Z98.890 STATUS POST ARTHROSCOPIC PARTIAL MEDIAL MENISCECTOMY: Primary | ICD-10-CM

## 2018-04-16 DIAGNOSIS — Z98.890 STATUS POST ARTHROSCOPY OF RIGHT KNEE: ICD-10-CM

## 2018-04-16 PROBLEM — M23.91 INTERNAL DERANGEMENT OF KNEE JOINT, RIGHT: Status: RESOLVED | Noted: 2017-12-12 | Resolved: 2018-04-16

## 2018-04-16 PROBLEM — S83.231A COMPLEX TEAR OF MEDIAL MENISCUS OF RIGHT KNEE AS CURRENT INJURY: Status: RESOLVED | Noted: 2017-12-27 | Resolved: 2018-04-16

## 2018-04-16 PROCEDURE — 99024 POSTOP FOLLOW-UP VISIT: CPT | Performed by: ORTHOPAEDIC SURGERY

## 2018-04-16 NOTE — PROGRESS NOTES
Patient:  Esa Lozano is a 42 y.o. male    Chief Complaint/ Reason for Visit:    Chief Complaint   Patient presents with   • Right Knee - Post-op, Pain       HPI:  Patient returns for checkup on his right knee.  He underwent arthroscopic partial medial meniscectomy about 3-1/2 months ago.  Due to work and other commitments, he's had to reschedule his follow-up couple of times.  He says for the most part he has continued to improve.  He's been doing his home exercise physical therapy that I taught him on a regular basis.    He has had no calf pain, no chest pain, and no shortness of breath.  He has had no trouble with his incisions.  He's been able to do his job as an  without a lot of difficulty from his knee.      PMH:    Past Medical History:   Diagnosis Date   • Arthritis     KNEE, HANDS OSTEO   • SAKINA (generalized anxiety disorder)    • GERD (gastroesophageal reflux disease)    • H/O chest x-ray 08/20/2015    DIFFUSE HYPERINFLATION OLD CALCIFIED BENIGN GRANULOMATOUS DISEASE NO ACCUTE CHANGES, BUT SIGNS OF AIRWAY INFLAMATION 8/2015 NO ACTIVE DISEASE   • Hypercholesterolemia     NO MED'S   • Migraine    • Right knee pain        PSH:    Past Surgical History:   Procedure Laterality Date   • HAND SURGERY  05/2011    1st digit left hand, accidently cut with a chain saw, 26 stitches. LOCAL ONLY   • KNEE ARTHROSCOPY Right 1/2/2018    Procedure: RIGHT KNEE ARTHROSCOPY, PARTIAL MEDIAL MENISECTOMY, EXTENSIVE CHONDROPLASTY AND MICRO FRACTURE, REMOVAL OF 8 LOOSE BODIES ;  Surgeon: Ihsan Fontanez MD;  Location: [facility] OR [facility];  Service:    • KNEE SURGERY Right 01/02/2018    arthroscopy   • TRANSESOPHAGEAL ECHOCARDIOGRAM (JERI)  08/21/2015    [facility]       Social Hx:    Social History     Social History   • Marital status:      Spouse name: N/A   • Number of children: N/A   • Years of education: N/A     Occupational History   • Not on file.     Social History Main Topics   • Smoking status:  "Former Smoker     Packs/day: 1.00     Years: 18.00     Types: Cigarettes   • Smokeless tobacco: Never Used      Comment: QUIT 13 YRS AGO   • Alcohol use Yes      Comment: RARE   • Drug use: No   • Sexual activity: Defer     Other Topics Concern   • Not on file     Social History Narrative   • No narrative on file       Family Hx:    Family History   Problem Relation Age of Onset   • Alcohol abuse Other    • Arthritis Other    • Cancer Other    • Diabetes Other    • Migraines Other    • Diabetes Mother    • Migraines Mother        Meds:    Current Outpatient Prescriptions:   •  ALPRAZolam (XANAX) 0.25 MG tablet, Take 1 tablet by mouth 3 (Three) Times a Day As Needed for anxiety., Disp: 30 tablet, Rfl: 2  •  lansoprazole (PREVACID) 30 MG capsule, Take 30 mg by mouth Daily., Disp: , Rfl:   •  Multiple Vitamins-Minerals (MULTIVITAMIN WITH MINERALS) tablet tablet, Take 1 tablet by mouth Daily., Disp: , Rfl:   •  rizatriptan (MAXALT) 10 MG tablet, Take 10 mg by mouth 1 (One) Time As Needed for Migraine. May repeat in 2 hours if needed, Disp: , Rfl:   •  meloxicam (MOBIC) 15 MG tablet, Take 7.5 mg by mouth Daily., Disp: , Rfl:     Allergies:    Allergies   Allergen Reactions   • Gluten Meal        ROS:  Review of Systems    Vitals:    04/16/18 0953   Temp: 98.6 °F (37 °C)   TempSrc: Temporal Artery    Weight: 87.9 kg (193 lb 12.8 oz)   Height: 179.1 cm (70.5\")     Body mass index is 27.41 kg/m².    Physical Exam    The patient is awake, alert, and oriented ×3.  The patient is in no acute distress.  Breathing is regular and unlabored with a respiratory rate of 12/m.  Extraocular movements and pupillary responses are symmetrically intact. Sclerae are anicteric.   Hearing is within normal limits.  Speech is within normal limits.  There is no jugular venous distention.    He is walking well without a limp.  He has a full active range of motion of the right knee.  His portal incisions are well healed.  There is a little " crepitus on patella grind.  There is no effusion.  There is no redness, bruising, or abnormal warmth.  His right calf is soft and nontender with no venous cord.          Assessment:     Diagnosis Plan   1. Status post arthroscopic partial medial meniscectomy     2. Status post arthroscopy of right knee     3. Patellofemoral arthralgia of left knee             Plan:  I discussed everything with Mr. Lozano at length.  I answered all his questions.  We discussed bracing and continued efforts at his home rehabilitation and physical therapy program.  We discussed some additional exercises, and also reviewed the importance of stretching and flexibility long-term.    He voiced understanding.  He may follow-up as needed.

## 2019-04-02 ENCOUNTER — OFFICE VISIT (OUTPATIENT)
Dept: FAMILY MEDICINE CLINIC | Facility: CLINIC | Age: 44
End: 2019-04-02

## 2019-04-02 VITALS
WEIGHT: 206 LBS | BODY MASS INDEX: 28.84 KG/M2 | HEIGHT: 71 IN | DIASTOLIC BLOOD PRESSURE: 78 MMHG | TEMPERATURE: 98.2 F | RESPIRATION RATE: 16 BRPM | HEART RATE: 81 BPM | SYSTOLIC BLOOD PRESSURE: 120 MMHG | OXYGEN SATURATION: 98 %

## 2019-04-02 DIAGNOSIS — K21.9 GASTROESOPHAGEAL REFLUX DISEASE WITHOUT ESOPHAGITIS: ICD-10-CM

## 2019-04-02 DIAGNOSIS — H10.022 OTHER MUCOPURULENT CONJUNCTIVITIS OF LEFT EYE: Primary | ICD-10-CM

## 2019-04-02 DIAGNOSIS — Z00.00 LABORATORY EXAMINATION ORDERED AS PART OF A ROUTINE GENERAL MEDICAL EXAMINATION: ICD-10-CM

## 2019-04-02 DIAGNOSIS — E78.2 HYPERLIPIDEMIA, MIXED: ICD-10-CM

## 2019-04-02 DIAGNOSIS — G43.009 MIGRAINE WITHOUT AURA AND WITHOUT STATUS MIGRAINOSUS, NOT INTRACTABLE: ICD-10-CM

## 2019-04-02 DIAGNOSIS — F41.8 SITUATIONAL ANXIETY: ICD-10-CM

## 2019-04-02 PROCEDURE — 99214 OFFICE O/P EST MOD 30 MIN: CPT | Performed by: PHYSICIAN ASSISTANT

## 2019-04-02 RX ORDER — TOBRAMYCIN 3 MG/ML
2 SOLUTION/ DROPS OPHTHALMIC
Qty: 5 ML | Refills: 1 | Status: SHIPPED | OUTPATIENT
Start: 2019-04-02 | End: 2021-02-12

## 2019-04-02 RX ORDER — LANSOPRAZOLE 30 MG/1
30 CAPSULE, DELAYED RELEASE ORAL DAILY
Qty: 90 CAPSULE | Refills: 3 | Status: SHIPPED | OUTPATIENT
Start: 2019-04-02

## 2019-04-02 RX ORDER — HYDROXYZINE HYDROCHLORIDE 25 MG/1
25 TABLET, FILM COATED ORAL 3 TIMES DAILY PRN
Qty: 45 TABLET | Refills: 1 | Status: SHIPPED | OUTPATIENT
Start: 2019-04-02 | End: 2021-02-12

## 2019-04-02 RX ORDER — RIZATRIPTAN BENZOATE 10 MG/1
10 TABLET ORAL ONCE AS NEEDED
Qty: 6 TABLET | Refills: 11 | Status: SHIPPED | OUTPATIENT
Start: 2019-04-02 | End: 2019-12-30

## 2019-04-02 NOTE — PROGRESS NOTES
Subjective   Esa Lozano is a 43 y.o. male.     History of Present Illness   Esa Lozano 43 y.o. male who presents today for left eye pain, d/c, some eye discomfort; some photophobic, not blurred. Eye itching.  Sunday, cut copper wire and ? Tiny piece in eye left; he did irrigate last PM and worse since then ---burn and discomfort. Eye d/c in AM in AMs since Sunday.  No prior eye problems.  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    Some allergy like congestion  No fever    Still feels like grain of sand in eye  Esa Lozano male 43 y.o. who presents today for follow up of situational anxiety and intol to Zoloft in past; cannot Rx Xanax.  He reports occas trouble falling asleep and unable to wind down and rest.  Anxiety is not daily.  Not depressed.. Onset of symptoms was approximately several years ago.  He denies current suicidal and homicidal ideation. Risk factors are lifestyle of multiple roles.  Previous treatment includes was on Xanax; has been on Zoloft.  He complains of the following medication side effects: none on Xanax.  The patient declines to go to counseling..  Will   Has seen Dr Levin for GERD and doing well on Prevacid; EGD 2015  GERD is controlled on PPI Rx.    Maxalt does treat migraines.  Has 3-4 migraines a month and this is very effective.  Will have him try Vistaril  Does take Aleve often for knee pain.  Long term use of NSAIDs can lead to heart disease and strokes, as well as GI bleeding/ulcers, and cause kidney disease. Advise labs to monitor renal  functions to be done at least every 6 months. Update labs; chol high in past    The following portions of the patient's history were reviewed and updated as appropriate: allergies, current medications, past family history, past medical history, past social history, past surgical history and problem list.    Review of Systems   Constitutional: Negative for activity change, appetite change and unexpected weight change.   HENT: Negative for nosebleeds and trouble swallowing.    Eyes: Positive for redness and itching. Negative for pain and visual disturbance.   Respiratory: Negative for chest tightness, shortness of breath and wheezing.    Cardiovascular: Negative for chest pain and palpitations.   Gastrointestinal: Negative for abdominal pain and blood in stool.   Endocrine: Negative.    Genitourinary: Negative for difficulty urinating and hematuria.   Musculoskeletal: Negative for joint swelling.   Skin: Negative for color change and rash.   Allergic/Immunologic: Positive for food allergies.   Neurological: Positive for headaches. Negative for syncope and speech difficulty.   Hematological: Negative for adenopathy.   Psychiatric/Behavioral: Negative for agitation and confusion. The patient is nervous/anxious.    All other systems reviewed and are negative.      Objective   Physical Exam   Constitutional: He is oriented to person, place, and time. He appears well-developed and well-nourished. No distress.   HENT:   Head: Normocephalic and atraumatic.   Right Ear: External ear normal.   Left Ear: External ear normal.   Nose: Nose normal.   Mouth/Throat: Oropharynx is clear and moist. No oropharyngeal exudate.   Eyes: EOM are normal. Pupils are equal, round, and reactive to light. Right eye exhibits no discharge. Left eye exhibits no discharge. No scleral icterus.   Fundi left sharp  No tearing left eye on exam; unable to see foreign body; not photophobic. Injected conjunctiva left eye; no matted lashes now; do orbital  edema; no blurred vision; right eye clear   Neck: Normal range of motion. Neck supple. No tracheal deviation present. No thyromegaly present.   Cardiovascular: Normal rate, regular rhythm, normal heart sounds, intact distal pulses and normal pulses. Exam reveals no gallop.   No murmur heard.  Pulmonary/Chest: Effort normal and breath sounds normal. No respiratory distress. He has no wheezes. He has no rales.   Abdominal: There is no tenderness.   Musculoskeletal: Normal range of motion.   Lymphadenopathy:     He has no cervical adenopathy.   Neurological: He is alert and oriented to person, place, and time. Coordination normal.   Skin: Skin is warm. No rash noted. No erythema. No pallor.   Psychiatric: He has a normal mood and affect. His behavior is normal. Judgment and thought content normal.   Nursing note and vitals reviewed.      Assessment/Plan   Problems Addressed this Visit        Cardiovascular and Mediastinum    Migraine    Relevant Medications    rizatriptan (MAXALT) 10 MG tablet       Digestive    GERD (gastroesophageal reflux disease)    Relevant Medications    lansoprazole (PREVACID) 30 MG capsule       Other    Generalized anxiety disorder    Relevant Medications    hydrOXYzine (ATARAX) 25 MG tablet      Other Visit Diagnoses     Other mucopurulent conjunctivitis of left eye    -  Primary    Relevant Medications    tobramycin 0.3 % solution ophthalmic solution    Laboratory examination ordered as part of a routine general medical examination        Relevant Orders    Comprehensive metabolic panel    Lipid panel    CBC and Differential    TSH    T4, Free    T3, Free    Vitamin D 25 Hydroxy    Vitamin B12    Folate            I Has seen Dr Levin for GERD and doing well on Prevacid; EGD 2015.  GERD is controlled on PPI Rx.  Start antibiotic drops; warm compress---can do gentle lid scrubs  Send opthal if worse or not better  Some d/c right eye this AM and do Tobrex bilat  Try Vistaril for situational  anxiety.  Warned patient that this medication can cause drowsiness and impair them operating machinery, including driving a car.  Caution is advised.  Long term use of NSAIDs can lead to heart disease and strokes, as well as GI bleeding/ulcers, and cause kidney disease. Advise labs to monitor renal functions to be done at least every 6 months.  Updating labs  Hx elevated lipids

## 2019-08-06 ENCOUNTER — OFFICE VISIT (OUTPATIENT)
Dept: ORTHOPEDIC SURGERY | Facility: CLINIC | Age: 44
End: 2019-08-06

## 2019-08-06 ENCOUNTER — TELEPHONE (OUTPATIENT)
Dept: ORTHOPEDIC SURGERY | Facility: CLINIC | Age: 44
End: 2019-08-06

## 2019-08-06 VITALS — TEMPERATURE: 98 F | HEIGHT: 70 IN | WEIGHT: 190 LBS | BODY MASS INDEX: 27.2 KG/M2

## 2019-08-06 DIAGNOSIS — M17.11 PRIMARY OSTEOARTHRITIS OF RIGHT KNEE: ICD-10-CM

## 2019-08-06 DIAGNOSIS — Z98.890 STATUS POST ARTHROSCOPY OF RIGHT KNEE: Primary | ICD-10-CM

## 2019-08-06 PROCEDURE — 99213 OFFICE O/P EST LOW 20 MIN: CPT | Performed by: ORTHOPAEDIC SURGERY

## 2019-08-06 PROCEDURE — 73562 X-RAY EXAM OF KNEE 3: CPT | Performed by: ORTHOPAEDIC SURGERY

## 2019-08-06 NOTE — PROGRESS NOTES
"Patient: Esa Lozano  YOB: 1975 43 y.o. male  Medical Record Number: [id number]    Chief Complaints:   Chief Complaint   Patient presents with   • Right Knee - Establish Care       History of Present Illness:Esa Lozano is a 43 y.o. male who presents with right knee pain is an aching about the medial aspect of the knee joint is worsened over the last year or so.  The pain is beginning to limit his activities he describes as a annoyance worse with weightbearing.    Allergies:   Allergies   Allergen Reactions   • Gluten Meal        Medications:   Current Outpatient Medications   Medication Sig Dispense Refill   • hydrOXYzine (ATARAX) 25 MG tablet Take 1 tablet by mouth 3 (Three) Times a Day As Needed for Anxiety. 45 tablet 1   • lansoprazole (PREVACID) 30 MG capsule Take 1 capsule by mouth Daily. For GERD 90 capsule 3   • meloxicam (MOBIC) 15 MG tablet Take 7.5 mg by mouth Daily.     • Multiple Vitamins-Minerals (MULTIVITAMIN WITH MINERALS) tablet tablet Take 1 tablet by mouth Daily.     • rizatriptan (MAXALT) 10 MG tablet Take 1 tablet by mouth 1 (One) Time As Needed for Migraine. May repeat in 2 hours if needed 6 tablet 11   • tobramycin 0.3 % solution ophthalmic solution Administer 2 drops to both eyes Every 2 (Two) Hours. Do every 2 hours while awake today then one drop QID days 2-5 5 mL 1     No current facility-administered medications for this visit.          The following portions of the patient's history were reviewed and updated as appropriate: allergies, current medications, past family history, past medical history, past social history, past surgical history and problem list.    Review of Systems:   A 14 point review of systems was performed. All systems negative except pertinent positives/negative listed in HPI above    Physical Exam:   Vitals:    08/06/19 1133   Temp: 98 °F (36.7 °C)   Weight: 86.2 kg (190 lb)   Height: 177.8 cm (70\")       General: A and O x 3, ASA, NAD    SCLERA:    " Normal    DENTITION:   Normal   Knee:  right    ALIGNMENT:     Neutral  ,   Patella tracks   midline    GAIT:    Antalgic    SKIN:    No abnormality    RANGE OF MOTION:   Painful flexion    STRENGTH:   5 / 5    LIGAMENTS:    No varus / valgus instability.   Negative  Lachman.    MENISCUS:     Positive  medial   Esdras       DISTAL PULSES:    Paplable    DISTAL SENSATION :   Intact    LYMPHATICS:     No   lymphadenopathy    OTHER:          - Positive  effusion      - No crepitance with ROM         Radiology:  Xrays 3views right knee  (ap,lateral, sunrise) were ordered and reviewed for evaluation of knee pain demonstrating moderate joint space narrowing. There are no previous films for comparision.    Assessment/Plan: Right knee OA-  Not bad enough for surgery yet. Will send to PT, If pain worsens can come see [facility] for steroid injection.      Jose Levin MD  8/6/2019

## 2019-08-06 NOTE — TELEPHONE ENCOUNTER
Beth,        I called [facility] and was advised this patients policy will not cover any gel injections of any kind.  He can only have cortisone.     Margie

## 2019-09-11 ENCOUNTER — OFFICE VISIT (OUTPATIENT)
Dept: PHYSICAL THERAPY | Facility: CLINIC | Age: 44
End: 2019-09-11

## 2019-09-11 DIAGNOSIS — R26.2 DIFFICULTY WALKING DOWN STAIRS: ICD-10-CM

## 2019-09-11 DIAGNOSIS — R26.2 DIFFICULTY WALKING UP STAIRS: ICD-10-CM

## 2019-09-11 DIAGNOSIS — M17.11 PRIMARY OSTEOARTHRITIS OF RIGHT KNEE: Primary | ICD-10-CM

## 2019-09-11 DIAGNOSIS — R26.2 DIFFICULTY WALKING DUE TO KNEE JOINT: ICD-10-CM

## 2019-09-11 PROCEDURE — 97161 PT EVAL LOW COMPLEX 20 MIN: CPT | Performed by: PHYSICAL THERAPIST

## 2019-09-11 PROCEDURE — 97035 APP MDLTY 1+ULTRASOUND EA 15: CPT | Performed by: PHYSICAL THERAPIST

## 2019-09-11 PROCEDURE — 97110 THERAPEUTIC EXERCISES: CPT | Performed by: PHYSICAL THERAPIST

## 2019-09-11 NOTE — PROGRESS NOTES
"     Physical Therapy Initial Evaluation and Plan of Care    Patient: Esa Lozano   : 1975  Diagnosis/ICD-10 Code:  Primary osteoarthritis of right knee [M17.11]  Referring practitioner: Jose Levin MD    Subjective Evaluation    History of Present Illness  Mechanism of injury: Pt is a 44 y/o WM who reports to the clinic with right knee pain after have arthroscopic knee surgery one year ago.  Pt states he was not able to do PT after surgery due to a insurance issue.  Pt states prior to having surgery he tried the gel injections, but they did not last. Pt states sitting for 30 minutes or longer his legs feel stiff and his left knee is starting to hurt.  Pt went to [facility]-ordered x-ray-referred to PT.  Pt did report almost falling out of his truck 2 weeks ago and his leg was stretched backwards, but states it felt better afterwards.  \"It felt like something was stretched and his knee felt loose.\"        Patient Occupation: works 6 days/week as an   Quality of life: good    Pain  Current pain ratin  At worst pain ratin  Location: right medial and lateral knee   Quality: tight and dull ache  Relieving factors: medications, heat and ice (soaking in a hot bath with Epson salt, Aleve PRN )  Aggravating factors: stairs, standing, ambulation and prolonged positioning    Hand dominance: right    Diagnostic Tests  X-ray: abnormal (moderate joint space narrowing)    Treatments  Previous treatment: injection treatment  Current treatment: medication  Patient Goals  Patient goals for therapy: decreased pain, increased motion and increased strength             Objective       Tenderness     Right Knee   Tenderness in the inferior patella and lateral joint line.     Active Range of Motion   Left Knee   Flexion: 134 degrees   Extension: 0 degrees     Right Knee   Flexion: 133 degrees   Extension: 3 degrees     Patellar Mobility   Left Knee Patellar tendons within functional limits include the medial, " lateral, superior and inferior.     Right Knee Patellar tendons within functional limits include the medial, lateral, superior and inferior.     Strength/Myotome Testing     Left Hip   Planes of Motion   Flexion: 5  Abduction: 5  Adduction: 5    Right Hip   Planes of Motion   Flexion: 5  Abduction: 5  Adduction: 4+    Left Knee   Flexion: 5  Extension: 5    Right Knee   Flexion: 5  Extension: 5    Tests     Left Hip   Positive Ely's.   90/90 SLR: Positive.   SLR: Positive.     Right Hip   Positive Ely's.   90/90 SLR: Positive.   SLR: Positive.     Left Knee   Positive patellar compression.     Right Knee   Positive patella-femoral grind.     Ambulation     Observational Gait   Gait: antalgic   Right swing time within functional limits. Decreased right stance time and right step length.   Right foot contact pattern: heel to toe         Assessment & Plan     Assessment  Impairments: abnormal gait, impaired balance, impaired physical strength, lacks appropriate home exercise program, pain with function and weight-bearing intolerance  Assessment details: Pt is a 44 y/o WM who reports to the clinic with c/o right knee pain, decreased flexibility, tenderness, decreased hip strength, and decreased functional mobility due to increased pain and stiffness with prolonged positioning.      Prognosis: good  Functional Limitations: walking, uncomfortable because of pain, sitting, standing and unable to perform repetitive tasks  Goals  Plan Goals: STGs: 2-3 weeks  1. Decrease right  knee pain 3/10 with standing and ambulation   2.  Decrease right lateral and inferior knee tenderness to minimal with palpation    3.  Increase B hamstring flexibility to minimal tightness with SLR test    4.  Pt ambulates into clinic without an antalgic gait right LE     LTGs: 4-6 weeks  1.  Pt Independent with HEP.  2.  Increase left hip add strength to 5/5  3.  Increase B hamstring flexibility to WNL with a SLR test  4.  Decrease right lateral and  inferior knee tenderness to minimal to none with palpation   5.  Pt with a negative prone B Ely's test       Plan  Therapy options: will be seen for skilled physical therapy services  Planned modality interventions: cryotherapy, electrical stimulation/Russian stimulation, thermotherapy (hydrocollator packs) and ultrasound  Planned therapy interventions: home exercise program, gait training, flexibility, strengthening, stretching, functional ROM exercises and therapeutic activities  Duration in visits: 12  Treatment plan discussed with: patient        Manual Therapy:         mins  59143;  Therapeutic Exercise:    30     mins  48715;     Neuromuscular Kg:        mins  17669;    Therapeutic Activity:          mins  85818;     Gait Training:           mins  84920;     Ultrasound:     8     mins  75794;    Electrical Stimulation:         mins  64932 ( );  Dry Needling          mins self-pay    Timed Treatment:  38    mins   Total Treatment:     59   mins    PT SIGNATURE: Karlie Diggs, PT   DATE TREATMENT INITIATED: 9/11/2019    Initial Certification  Certification Period: 12/10/2019  I certify that the therapy services are furnished while this patient is under my care.  The services outlined above are required by this patient, and will be reviewed every 90 days.     PHYSICIAN: Jose Levin MD      DATE:     Please sign and return via fax to [phone].. Thank you, [facility].

## 2019-09-13 ENCOUNTER — OFFICE VISIT (OUTPATIENT)
Dept: PHYSICAL THERAPY | Facility: CLINIC | Age: 44
End: 2019-09-13

## 2019-09-13 DIAGNOSIS — R26.2 DIFFICULTY WALKING UP STAIRS: ICD-10-CM

## 2019-09-13 DIAGNOSIS — R26.2 DIFFICULTY WALKING DOWN STAIRS: ICD-10-CM

## 2019-09-13 DIAGNOSIS — M17.11 PRIMARY OSTEOARTHRITIS OF RIGHT KNEE: Primary | ICD-10-CM

## 2019-09-13 DIAGNOSIS — R26.2 DIFFICULTY WALKING DUE TO KNEE JOINT: ICD-10-CM

## 2019-09-13 PROCEDURE — 97035 APP MDLTY 1+ULTRASOUND EA 15: CPT | Performed by: PHYSICAL THERAPIST

## 2019-09-13 PROCEDURE — 97110 THERAPEUTIC EXERCISES: CPT | Performed by: PHYSICAL THERAPIST

## 2019-09-13 NOTE — PROGRESS NOTES
Physical Therapy Daily Progress Note        Patient: Esa Lozano   : 1975  Diagnosis/ICD-10 Code:  Primary osteoarthritis of right knee [M17.11]  Referring practitioner: No ref. provider found  Date of Initial Visit: Type: THERAPY  Noted: 2019  Today's Date: 2019  Patient seen for 2 sessions         Esa Lozano reports: he reported his knee has been popping and being a little weird since starting the exercises.  He said the other one is starting to act up a little bit.         Subjective     Objective   See Exercise, Manual, and Modality Logs for complete treatment.     Tender superior and inferior patella    Assessment/Plan  Pt's first treatment session since eval. He reported increased popping since starting exercises thus limited progressions to continue to assess tolerance. Adjusted US to superior and inferior patella due to tenderness in these areas. Will continue to monitor tolerance to exercises and progress as appropriate to progress towards goals without increased symptoms.     Progress per Plan of Care           Manual Therapy:         mins  18176;  Therapeutic Exercise:    25     mins  44064;     Neuromuscular Kg:        mins  21227;    Therapeutic Activity:          mins  62027;     Gait Training:           mins  16395;     Ultrasound:     8     mins  96513;    Electrical Stimulation:         mins  02223 ( );  Dry Needling          mins self-pay    Timed Treatment:   33   mins   Total Treatment:     45   mins    Paulina Florentino PTA  Physical Therapist Assistant

## 2019-09-17 ENCOUNTER — TREATMENT (OUTPATIENT)
Dept: PHYSICAL THERAPY | Facility: CLINIC | Age: 44
End: 2019-09-17

## 2019-09-17 DIAGNOSIS — R26.2 DIFFICULTY WALKING DOWN STAIRS: ICD-10-CM

## 2019-09-17 DIAGNOSIS — R26.2 DIFFICULTY WALKING UP STAIRS: ICD-10-CM

## 2019-09-17 DIAGNOSIS — M17.11 PRIMARY OSTEOARTHRITIS OF RIGHT KNEE: Primary | ICD-10-CM

## 2019-09-17 DIAGNOSIS — R26.2 DIFFICULTY WALKING DUE TO KNEE JOINT: ICD-10-CM

## 2019-09-17 PROCEDURE — 97110 THERAPEUTIC EXERCISES: CPT | Performed by: PHYSICAL THERAPIST

## 2019-09-17 PROCEDURE — 97035 APP MDLTY 1+ULTRASOUND EA 15: CPT | Performed by: PHYSICAL THERAPIST

## 2019-09-17 NOTE — PROGRESS NOTES
Physical Therapy Daily Progress Note        Patient: Esa Lozano   : 1975  Diagnosis/ICD-10 Code:  Primary osteoarthritis of right knee [M17.11]  Referring practitioner: Jose Levin MD  Date of Initial Visit: Type: THERAPY  Noted: 2019  Today's Date: 2019  Patient seen for 3 sessions         Esa Lozano reports: his knee has been doing better since last session. He reported he has had a good day and isn't having any pain.         Subjective     Objective   See Exercise, Manual, and Modality Logs for complete treatment.     Very minimal tenderness superior patella and medial distal patella    Assessment/Plan  Pt able to progress exercises and strengthening without increased pain this date. Minimal cues provided for technique and mainly with addition of new exercises. Decreased pain during daily routine reported and minimal pain with palpation. Continued with estim to address.  Issued updated exercises for HEP. Will continued to monitor tolerance to progression of exercises and add step ups if exercises tolerated.     Progress per Plan of Care           Manual Therapy:         mins  24826;  Therapeutic Exercise:    25     mins  97179;     Neuromuscular Kg:        mins  51461;    Therapeutic Activity:          mins  73156;     Gait Training:           mins  56170;     Ultrasound:    8      mins  42405;    Electrical Stimulation:         mins  03541 (MC );  Dry Needling          mins self-pay    Timed Treatment:   33   mins   Total Treatment:     40   mins    Paulina Florentino PTA  Physical Therapist Assistant

## 2019-09-20 ENCOUNTER — TREATMENT (OUTPATIENT)
Dept: PHYSICAL THERAPY | Facility: CLINIC | Age: 44
End: 2019-09-20

## 2019-09-20 DIAGNOSIS — R26.2 DIFFICULTY WALKING DUE TO KNEE JOINT: ICD-10-CM

## 2019-09-20 DIAGNOSIS — M17.11 PRIMARY OSTEOARTHRITIS OF RIGHT KNEE: Primary | ICD-10-CM

## 2019-09-20 DIAGNOSIS — R26.2 DIFFICULTY WALKING DOWN STAIRS: ICD-10-CM

## 2019-09-20 DIAGNOSIS — R26.2 DIFFICULTY WALKING UP STAIRS: ICD-10-CM

## 2019-09-20 PROCEDURE — 97110 THERAPEUTIC EXERCISES: CPT | Performed by: PHYSICAL THERAPIST

## 2019-09-20 PROCEDURE — 97035 APP MDLTY 1+ULTRASOUND EA 15: CPT | Performed by: PHYSICAL THERAPIST

## 2019-09-20 NOTE — PROGRESS NOTES
Physical Therapy Daily Progress Note    Visit # : 4  Esa Lozano reports: he is feeling better and denies any increased soreness or pain with the exercises.      Subjective     Objective   See Exercise, Manual, and Modality Logs for complete treatment.       Assessment & Plan     Assessment  Assessment details: Pt is progressing well with all exercises.  Pt was able to increase weight and denied having any popping or pain in the right knee.  Pt still remains tender to palpation surrounding the patella.  Will continue to see pt 2x/week for stretching, strengthening, and modalities PRN.          Progress per Plan of Care           Manual Therapy:         mins  28954;  Therapeutic Exercise:   32      mins  21507;     Neuromuscular Kg:        mins  94869;    Therapeutic Activity:          mins  53705;     Gait Training:           mins  89549;     Ultrasound:     8     mins  20783;    Electrical Stimulation:         mins  22012 ( );  Dry Needling          mins self-pay    Timed Treatment:  40   mins   Total Treatment:     47   mins    Karlie Diggs, PT  Physical Therapist

## 2019-09-27 ENCOUNTER — TREATMENT (OUTPATIENT)
Dept: PHYSICAL THERAPY | Facility: CLINIC | Age: 44
End: 2019-09-27

## 2019-09-27 DIAGNOSIS — R26.2 DIFFICULTY WALKING DOWN STAIRS: ICD-10-CM

## 2019-09-27 DIAGNOSIS — R26.2 DIFFICULTY WALKING UP STAIRS: ICD-10-CM

## 2019-09-27 DIAGNOSIS — M17.11 PRIMARY OSTEOARTHRITIS OF RIGHT KNEE: Primary | ICD-10-CM

## 2019-09-27 DIAGNOSIS — R26.2 DIFFICULTY WALKING DUE TO KNEE JOINT: ICD-10-CM

## 2019-09-27 PROCEDURE — 97110 THERAPEUTIC EXERCISES: CPT | Performed by: PHYSICAL THERAPIST

## 2019-09-27 PROCEDURE — 97035 APP MDLTY 1+ULTRASOUND EA 15: CPT | Performed by: PHYSICAL THERAPIST

## 2019-09-27 NOTE — PROGRESS NOTES
Physical Therapy Daily Progress Note    Visit # : 5  Esa Lozano reports: his knee is doing okay, but he was sore last week after doing the weights.      Subjective     Objective       Tenderness     Right Knee   Tenderness in the inferior patella, lateral patella and superior patella.     Additional Tenderness Details  Minimal right knee tenderness surrounding the patella      See Exercise, Manual, and Modality Logs for complete treatment.       Assessment & Plan     Assessment  Assessment details: Pt is progressing well with all exercises, but did not increase today secondary to having some soreness after last treatment.  Added SLB and had some difficulty with proprioception on an uneven surface.   Pt still remains tender to palpation surrounding the patella, but decreasing.  Will continue to see pt 2x/week for stretching, strengthening, and modalities PRN.          Progress per Plan of Care           Manual Therapy:         mins  56126;  Therapeutic Exercise:   35      mins  89012;     Neuromuscular Kg:        mins  80536;    Therapeutic Activity:          mins  72021;     Gait Training:           mins  37272;     Ultrasound:    8      mins  13842;    Electrical Stimulation:         mins  80067 ( );  Dry Needling          mins self-pay    Timed Treatment:  43    mins   Total Treatment:     47   mins    Karlie Diggs, PT  Physical Therapist

## 2019-10-02 ENCOUNTER — TREATMENT (OUTPATIENT)
Dept: PHYSICAL THERAPY | Facility: CLINIC | Age: 44
End: 2019-10-02

## 2019-10-02 DIAGNOSIS — R26.2 DIFFICULTY WALKING DUE TO KNEE JOINT: ICD-10-CM

## 2019-10-02 DIAGNOSIS — R26.2 DIFFICULTY WALKING UP STAIRS: ICD-10-CM

## 2019-10-02 DIAGNOSIS — R26.2 DIFFICULTY WALKING DOWN STAIRS: ICD-10-CM

## 2019-10-02 DIAGNOSIS — M17.11 PRIMARY OSTEOARTHRITIS OF RIGHT KNEE: Primary | ICD-10-CM

## 2019-10-02 PROCEDURE — 97110 THERAPEUTIC EXERCISES: CPT | Performed by: PHYSICAL THERAPIST

## 2019-10-02 PROCEDURE — 97035 APP MDLTY 1+ULTRASOUND EA 15: CPT | Performed by: PHYSICAL THERAPIST

## 2019-10-02 NOTE — PROGRESS NOTES
Physical Therapy Daily Progress Note    Visit # : 6  Esa Lozano reports: his knee is doing good.  States he is getting use to the weights and denies having any increased knee soreness.      Subjective     Objective       Tenderness     Right Knee   Tenderness in the inferior patella and superior patella.     Additional Tenderness Details  Minimal point tenderness right knee      See Exercise, Manual, and Modality Logs for complete treatment.       Assessment & Plan     Assessment  Assessment details: Pt is progressing well with therapy.  Pt is reporting less soreness with HEP.  Pt still remains tender to palpation surrounding the patella, but decreasing.  Pt was able to increase strengthening exercises without difficulty or increased pain.  Will continue to see pt 1-2x/week for stretching, strengthening, and modalities PRN.          Progress per Plan of Care           Manual Therapy:         mins  29076;  Therapeutic Exercise:   35      mins  56079;     Neuromuscular Kg:        mins  34743;    Therapeutic Activity:          mins  06332;     Gait Training:           mins  61329;     Ultrasound:     8     mins  02220;    Electrical Stimulation:         mins  44766 ( );  Dry Needling          mins self-pay    Timed Treatment:  43    mins   Total Treatment:     53   mins    Karlie Diggs, PT  Physical Therapist

## 2019-10-04 ENCOUNTER — TREATMENT (OUTPATIENT)
Dept: PHYSICAL THERAPY | Facility: CLINIC | Age: 44
End: 2019-10-04

## 2019-10-04 DIAGNOSIS — R26.2 DIFFICULTY WALKING DOWN STAIRS: ICD-10-CM

## 2019-10-04 DIAGNOSIS — M17.11 PRIMARY OSTEOARTHRITIS OF RIGHT KNEE: Primary | ICD-10-CM

## 2019-10-04 DIAGNOSIS — R26.2 DIFFICULTY WALKING DUE TO KNEE JOINT: ICD-10-CM

## 2019-10-04 DIAGNOSIS — R26.2 DIFFICULTY WALKING UP STAIRS: ICD-10-CM

## 2019-10-04 PROCEDURE — 97110 THERAPEUTIC EXERCISES: CPT | Performed by: PHYSICAL THERAPIST

## 2019-10-04 PROCEDURE — 97035 APP MDLTY 1+ULTRASOUND EA 15: CPT | Performed by: PHYSICAL THERAPIST

## 2019-10-04 NOTE — PROGRESS NOTES
Physical Therapy Daily Progress Note    Visit # : 7  Esa Lozano reports: his knee is feeling pretty good.  States it gets a little sore after going up and down the steps all day long.      Subjective     Objective   See Exercise, Manual, and Modality Logs for complete treatment.       Assessment & Plan     Assessment  Assessment details: Pt is progressing well with therapy.  Pt is reporting less soreness with HEP.  Pt still remains tender to palpation surrounding the patella, but decreasing.  Pt was able to increase strengthening exercises without difficulty or increased pain.  Will continue to see pt 1-2x/week for stretching, strengthening, and modalities PRN.  Decreasing frequency to once per week for the next 2-3 weeks with plans to discharge to [facility] soon.            Progress per Plan of Care           Manual Therapy:         mins  75253;  Therapeutic Exercise:  32       mins  80155;     Neuromuscular Kg:        mins  28909;    Therapeutic Activity:          mins  68067;     Gait Training:           mins  24282;     Ultrasound:    8      mins  59649;    Electrical Stimulation:         mins  71073 ( );  Dry Needling          mins self-pay    Timed Treatment:  40    mins   Total Treatment:     44   mins    Karlie Diggs, PT  Physical Therapist

## 2019-10-17 ENCOUNTER — TREATMENT (OUTPATIENT)
Dept: PHYSICAL THERAPY | Facility: CLINIC | Age: 44
End: 2019-10-17

## 2019-10-17 DIAGNOSIS — R26.2 DIFFICULTY WALKING DOWN STAIRS: ICD-10-CM

## 2019-10-17 DIAGNOSIS — R26.2 DIFFICULTY WALKING UP STAIRS: ICD-10-CM

## 2019-10-17 DIAGNOSIS — R26.2 DIFFICULTY WALKING DUE TO KNEE JOINT: ICD-10-CM

## 2019-10-17 DIAGNOSIS — M17.11 PRIMARY OSTEOARTHRITIS OF RIGHT KNEE: Primary | ICD-10-CM

## 2019-10-17 PROCEDURE — 97110 THERAPEUTIC EXERCISES: CPT | Performed by: PHYSICAL THERAPIST

## 2019-10-17 PROCEDURE — 97035 APP MDLTY 1+ULTRASOUND EA 15: CPT | Performed by: PHYSICAL THERAPIST

## 2019-10-17 NOTE — PROGRESS NOTES
"   Physical Therapy Daily Progress Note        Patient: Esa Lozano   : 1975  Diagnosis/ICD-10 Code:  Primary osteoarthritis of right knee [M17.11]  Referring practitioner: Jose Levin MD  Date of Initial Visit: Type: THERAPY  Noted: 2019  Today's Date: 10/17/2019  Patient seen for 8 sessions         Esa Lozano reports: his knee is doing pretty good. It was bothering him some yesterday but he isn't sure why. He said it was \"pinchy\" feeling yesterday for about a 1/2 hour than it got better.         Subjective     Objective   See Exercise, Manual, and Modality Logs for complete treatment.     Minimal tenderness to palpation distal medial patella and superior patella    Assessment/Plan  Pt tolerated all exercises without complaints. Gentle progressions of strengthening this date with US to assist with continued pain control. Will continue to progress strengthening and HEP to progress toward self management of condition in 1-2 sessions.     Progress per Plan of Care           Manual Therapy:         mins  62780;  Therapeutic Exercise:    37     mins  71811;     Neuromuscular Kg:        mins  06119;    Therapeutic Activity:          mins  62429;     Gait Training:           mins  78543;     Ultrasound:     8     mins  38213;    Electrical Stimulation:         mins  88238 (MC );  Dry Needling          mins self-pay    Timed Treatment:   45   mins   Total Treatment:     45   mins    Paulina Florentino PTA  Physical Therapist Assistant  "

## 2019-10-24 ENCOUNTER — TREATMENT (OUTPATIENT)
Dept: PHYSICAL THERAPY | Facility: CLINIC | Age: 44
End: 2019-10-24

## 2019-10-24 DIAGNOSIS — R26.2 DIFFICULTY WALKING DOWN STAIRS: ICD-10-CM

## 2019-10-24 DIAGNOSIS — M17.11 PRIMARY OSTEOARTHRITIS OF RIGHT KNEE: Primary | ICD-10-CM

## 2019-10-24 DIAGNOSIS — R26.2 DIFFICULTY WALKING UP STAIRS: ICD-10-CM

## 2019-10-24 DIAGNOSIS — R26.2 DIFFICULTY WALKING DUE TO KNEE JOINT: ICD-10-CM

## 2019-10-24 PROCEDURE — 97110 THERAPEUTIC EXERCISES: CPT | Performed by: PHYSICAL THERAPIST

## 2019-10-24 PROCEDURE — 97530 THERAPEUTIC ACTIVITIES: CPT | Performed by: PHYSICAL THERAPIST

## 2019-10-24 PROCEDURE — 97035 APP MDLTY 1+ULTRASOUND EA 15: CPT | Performed by: PHYSICAL THERAPIST

## 2019-10-24 NOTE — PROGRESS NOTES
Physical Therapy Daily Progress Note        Patient: Esa Lozano   : 1975  Diagnosis/ICD-10 Code:  Primary osteoarthritis of right knee [M17.11]  Referring practitioner: Jose Levin MD  Date of Initial Visit: Type: THERAPY  Noted: 2019  Today's Date: 10/24/2019  Patient seen for 9 sessions         Esa Lozano reports: his knee has been doing good and hasn't been too bad lately. He said sometimes there feels like cartilage is moving around but the aching is better.  He has some noticed some neil horses on the inside of his thigh and hamstring which he hasn't had before.        Subjective   Current Pain: 0/10  Highest Pain: 2-3/10 (after being on a ladder all day long)    LEFS: 72/80    Objective       Tenderness     Right Knee   Tenderness in the inferior patella and superior patella.     Additional Tenderness Details  Very slight tenderness    Active Range of Motion     Right Knee   Flexion: 139 degrees   Extension: 0 degrees     Patellar Mobility     Right Knee Patellar tendons within functional limits include the medial, lateral, superior and inferior.     Strength/Myotome Testing     Right Hip   Planes of Motion   Flexion: 5  Abduction: 5  Adduction: 4+    Right Knee   Flexion: 5  Extension: 5     See Exercise, Manual, and Modality Logs for complete treatment.       Assessment/Plan  Pt reported no significant pain or limitations with knee at this time. He only has pain after standing on his ladder all day. Per LEFS, he has the most difficulty with running however reported he avoids it to protect his knee.  Pt demonstrated independence with exercises requiring very minimal cues for technique. Pt discharged to [facility] this date. No questions or concerns with D/C.  Educated pt on importance of keeping up with [facility] and he verbalized understanding.     Discharged to [facility]           Manual Therapy:         mins  57534;  Therapeutic Exercise:    15     mins  25104;     Neuromuscular Kg:        mins   47927;    Therapeutic Activity:     10     mins  05491;     Gait Training:           mins  30500;     Ultrasound:     8     mins  46869;    Electrical Stimulation:         mins  54692 ( );  Dry Needling          mins self-pay    Timed Treatment:   33   mins   Total Treatment:     52   mins    Paulina Florentino PTA  Physical Therapist Assistant

## 2019-12-30 RX ORDER — RIZATRIPTAN BENZOATE 10 MG/1
TABLET ORAL
Qty: 6 TABLET | Refills: 0 | Status: SHIPPED | OUTPATIENT
Start: 2019-12-30 | End: 2020-01-21 | Stop reason: SDUPTHER

## 2020-01-21 RX ORDER — RIZATRIPTAN BENZOATE 10 MG/1
10 TABLET ORAL ONCE AS NEEDED
Qty: 6 TABLET | Refills: 0 | Status: SHIPPED | OUTPATIENT
Start: 2020-01-21 | End: 2020-03-25 | Stop reason: SDUPTHER

## 2020-03-25 RX ORDER — RIZATRIPTAN BENZOATE 10 MG/1
10 TABLET ORAL ONCE AS NEEDED
Qty: 6 TABLET | Refills: 0 | Status: SHIPPED | OUTPATIENT
Start: 2020-03-25

## 2020-06-26 ENCOUNTER — OFFICE (AMBULATORY)
Dept: URBAN - METROPOLITAN AREA CLINIC 75 | Facility: CLINIC | Age: 45
End: 2020-06-26

## 2020-06-26 VITALS — WEIGHT: 200 LBS | HEIGHT: 70 IN | TEMPERATURE: 97.4 F

## 2020-06-26 DIAGNOSIS — R10.30 LOWER ABDOMINAL PAIN, UNSPECIFIED: ICD-10-CM

## 2020-06-26 DIAGNOSIS — K90.0 CELIAC DISEASE: ICD-10-CM

## 2020-06-26 DIAGNOSIS — R19.5 OTHER FECAL ABNORMALITIES: ICD-10-CM

## 2020-06-26 DIAGNOSIS — K21.9 GASTRO-ESOPHAGEAL REFLUX DISEASE WITHOUT ESOPHAGITIS: ICD-10-CM

## 2020-06-26 PROCEDURE — 99214 OFFICE O/P EST MOD 30 MIN: CPT | Performed by: INTERNAL MEDICINE

## 2020-07-22 VITALS
OXYGEN SATURATION: 98 % | DIASTOLIC BLOOD PRESSURE: 71 MMHG | HEART RATE: 70 BPM | RESPIRATION RATE: 25 BRPM | SYSTOLIC BLOOD PRESSURE: 124 MMHG | SYSTOLIC BLOOD PRESSURE: 123 MMHG | DIASTOLIC BLOOD PRESSURE: 87 MMHG | TEMPERATURE: 96.8 F | HEART RATE: 72 BPM | SYSTOLIC BLOOD PRESSURE: 126 MMHG | DIASTOLIC BLOOD PRESSURE: 90 MMHG | SYSTOLIC BLOOD PRESSURE: 130 MMHG | HEIGHT: 70 IN | SYSTOLIC BLOOD PRESSURE: 127 MMHG | TEMPERATURE: 98 F | OXYGEN SATURATION: 94 % | RESPIRATION RATE: 21 BRPM | DIASTOLIC BLOOD PRESSURE: 88 MMHG | DIASTOLIC BLOOD PRESSURE: 80 MMHG | DIASTOLIC BLOOD PRESSURE: 84 MMHG | OXYGEN SATURATION: 96 % | OXYGEN SATURATION: 97 % | HEART RATE: 75 BPM | OXYGEN SATURATION: 95 % | SYSTOLIC BLOOD PRESSURE: 146 MMHG | SYSTOLIC BLOOD PRESSURE: 128 MMHG | DIASTOLIC BLOOD PRESSURE: 91 MMHG | HEART RATE: 80 BPM | OXYGEN SATURATION: 100 % | RESPIRATION RATE: 14 BRPM | WEIGHT: 195 LBS | DIASTOLIC BLOOD PRESSURE: 81 MMHG | RESPIRATION RATE: 23 BRPM | SYSTOLIC BLOOD PRESSURE: 116 MMHG | RESPIRATION RATE: 20 BRPM | DIASTOLIC BLOOD PRESSURE: 65 MMHG | SYSTOLIC BLOOD PRESSURE: 141 MMHG | HEART RATE: 81 BPM | HEART RATE: 78 BPM | HEART RATE: 73 BPM

## 2020-07-25 ENCOUNTER — OFFICE (AMBULATORY)
Dept: URBAN - METROPOLITAN AREA LAB 2 | Facility: LAB | Age: 45
End: 2020-07-25
Payer: COMMERCIAL

## 2020-07-25 DIAGNOSIS — Z11.59 ENCOUNTER FOR SCREENING FOR OTHER VIRAL DISEASES: ICD-10-CM

## 2020-07-25 PROCEDURE — 87633 RESP VIRUS 12-25 TARGETS: CPT | Performed by: INTERNAL MEDICINE

## 2020-07-28 ENCOUNTER — OFFICE (AMBULATORY)
Dept: URBAN - METROPOLITAN AREA PATHOLOGY 4 | Facility: PATHOLOGY | Age: 45
End: 2020-07-28

## 2020-07-28 ENCOUNTER — AMBULATORY SURGICAL CENTER (AMBULATORY)
Dept: URBAN - METROPOLITAN AREA SURGERY 17 | Facility: SURGERY | Age: 45
End: 2020-07-28

## 2020-07-28 DIAGNOSIS — D12.3 BENIGN NEOPLASM OF TRANSVERSE COLON: ICD-10-CM

## 2020-07-28 DIAGNOSIS — D12.0 BENIGN NEOPLASM OF CECUM: ICD-10-CM

## 2020-07-28 DIAGNOSIS — R93.3 ABNORMAL FINDINGS ON DIAGNOSTIC IMAGING OF OTHER PARTS OF DI: ICD-10-CM

## 2020-07-28 LAB
GI HISTOLOGY: A. UNSPECIFIED: (no result)
GI HISTOLOGY: B. UNSPECIFIED: (no result)
GI HISTOLOGY: C. UNSPECIFIED: (no result)
GI HISTOLOGY: D. UNSPECIFIED: (no result)
GI HISTOLOGY: E. UNSPECIFIED: (no result)
GI HISTOLOGY: F. UNSPECIFIED: (no result)
GI HISTOLOGY: PDF REPORT: (no result)

## 2020-07-28 PROCEDURE — 88305 TISSUE EXAM BY PATHOLOGIST: CPT | Performed by: INTERNAL MEDICINE

## 2020-07-28 PROCEDURE — 45380 COLONOSCOPY AND BIOPSY: CPT | Performed by: INTERNAL MEDICINE

## 2020-08-04 ENCOUNTER — OFFICE VISIT (OUTPATIENT)
Dept: ORTHOPEDIC SURGERY | Facility: CLINIC | Age: 45
End: 2020-08-04

## 2020-08-04 VITALS — WEIGHT: 200 LBS | TEMPERATURE: 97.1 F | BODY MASS INDEX: 28.63 KG/M2 | HEIGHT: 70 IN

## 2020-08-04 DIAGNOSIS — Z98.890 STATUS POST ARTHROSCOPY OF RIGHT KNEE: Primary | ICD-10-CM

## 2020-08-04 PROCEDURE — 99213 OFFICE O/P EST LOW 20 MIN: CPT | Performed by: ORTHOPAEDIC SURGERY

## 2020-08-04 PROCEDURE — 73562 X-RAY EXAM OF KNEE 3: CPT | Performed by: ORTHOPAEDIC SURGERY

## 2020-08-04 RX ORDER — MELOXICAM 15 MG/1
TABLET ORAL
Qty: 30 TABLET | Refills: 3 | Status: SHIPPED | OUTPATIENT
Start: 2020-08-04 | End: 2021-02-12

## 2020-08-04 NOTE — PROGRESS NOTES
"Patient: Esa Lozano  YOB: 1975 44 y.o. male  Medical Record Number: [id number]    Chief Complaints:   Chief Complaint   Patient presents with   • Right Knee - Follow-up       History of Present Illness:Esa Lozano is a 44 y.o. male who presents for follow-up of  Right medial knee aching moderate at times with activity - slightly worse than 1 year ago. Gel injections didn't help.    Allergies:   Allergies   Allergen Reactions   • Gluten Meal        Medications:   Current Outpatient Medications   Medication Sig Dispense Refill   • lansoprazole (PREVACID) 30 MG capsule Take 1 capsule by mouth Daily. For GERD 90 capsule 3   • Multiple Vitamins-Minerals (MULTIVITAMIN WITH MINERALS) tablet tablet Take 1 tablet by mouth Daily.     • rizatriptan (MAXALT) 10 MG tablet Take 1 tablet by mouth 1 (One) Time As Needed for Migraine for up to 1 dose. May repeat in 2 hours if needed 6 tablet 0   • hydrOXYzine (ATARAX) 25 MG tablet Take 1 tablet by mouth 3 (Three) Times a Day As Needed for Anxiety. 45 tablet 1   • meloxicam (MOBIC) 15 MG tablet Take 7.5 mg by mouth Daily.     • tobramycin 0.3 % solution ophthalmic solution Administer 2 drops to both eyes Every 2 (Two) Hours. Do every 2 hours while awake today then one drop QID days 2-5 5 mL 1     No current facility-administered medications for this visit.          The following portions of the patient's history were reviewed and updated as appropriate: allergies, current medications, past family history, past medical history, past social history, past surgical history and problem list.    Review of Systems:   A 14 point review of systems was performed. All systems negative except pertinent positives/negative listed in HPI above    Physical Exam:   Vitals:    08/04/20 0807   Temp: 97.1 °F (36.2 °C)   TempSrc: Temporal   Weight: 90.7 kg (200 lb)   Height: 177.8 cm (70\")       General: A and O x 3, ASA, NAD    SCLERA:    Normal    DENTITION:   Normal  Knee:  " right    ALIGNMENT:     Neutral  ,   Patella tracks   midline    GAIT:    Antalgic    SKIN:    No abnormality    RANGE OF MOTION:   Painful flexion    STRENGTH:   5 / 5    LIGAMENTS:    No varus / valgus instability.   Negative  Lachman.    MENISCUS:     Positive  medial   Esdras       DISTAL PULSES:    Paplable    DISTAL SENSATION :   Intact    LYMPHATICS:     No   lymphadenopathy    OTHER:          - Positive  effusion      - No crepitance with ROM       Radiology:  Xrays 3views right knee (ap,lateral, sunrise) were ordered and reviewed for evaluation of knee pain demonstratingmild joint space narrowing and irregularity of the medial femoral condyle- no change vs xrays last year.    Assessment/Plan:  Right knee OA-  In maintence now-we will work on quad exercises for knee stabilization of called in a prescription for meloxicam to be taken intermittently if he has bad days.  Additionally if he has a flareup he can call and we will get him in to see Deisy for a knee injection.  I counseled him on weight management as well.  He understands at some point down the road he is likely looking at knee replacement.  For now we will continue with maintenance as above.

## 2020-12-22 ENCOUNTER — TELEHEALTH PROVIDED OTHER THAN IN PATIENT'S HOME (AMBULATORY)
Dept: URBAN - METROPOLITAN AREA TELEHEALTH 6 | Facility: TELEHEALTH | Age: 45
End: 2020-12-22

## 2020-12-22 VITALS — HEIGHT: 70 IN | WEIGHT: 200 LBS

## 2020-12-22 DIAGNOSIS — K31.9 DISEASE OF STOMACH AND DUODENUM, UNSPECIFIED: ICD-10-CM

## 2020-12-22 DIAGNOSIS — K90.0 CELIAC DISEASE: ICD-10-CM

## 2020-12-22 DIAGNOSIS — K63.5 POLYP OF COLON: ICD-10-CM

## 2020-12-22 DIAGNOSIS — F45.8 OTHER SOMATOFORM DISORDERS: ICD-10-CM

## 2020-12-22 DIAGNOSIS — Z11.59 ENCOUNTER FOR SCREENING FOR OTHER VIRAL DISEASES: ICD-10-CM

## 2020-12-22 DIAGNOSIS — K21.9 GASTRO-ESOPHAGEAL REFLUX DISEASE WITHOUT ESOPHAGITIS: ICD-10-CM

## 2020-12-22 DIAGNOSIS — R19.5 OTHER FECAL ABNORMALITIES: ICD-10-CM

## 2020-12-22 DIAGNOSIS — R93.3 ABNORMAL FINDINGS ON DIAGNOSTIC IMAGING OF OTHER PARTS OF DI: ICD-10-CM

## 2020-12-22 DIAGNOSIS — K29.70 GASTRITIS, UNSPECIFIED, WITHOUT BLEEDING: ICD-10-CM

## 2020-12-22 DIAGNOSIS — R10.30 LOWER ABDOMINAL PAIN, UNSPECIFIED: ICD-10-CM

## 2020-12-22 PROCEDURE — 99213 OFFICE O/P EST LOW 20 MIN: CPT | Mod: 95 | Performed by: NURSE PRACTITIONER

## 2020-12-22 RX ORDER — LANSOPRAZOLE 30 MG/1
30 CAPSULE, DELAYED RELEASE PELLETS ORAL
Qty: 90 | Refills: 4 | Status: COMPLETED
End: 2024-06-13

## 2021-02-12 ENCOUNTER — OFFICE VISIT (OUTPATIENT)
Dept: INTERNAL MEDICINE | Facility: CLINIC | Age: 46
End: 2021-02-12

## 2021-02-12 VITALS
OXYGEN SATURATION: 99 % | WEIGHT: 216 LBS | HEART RATE: 81 BPM | HEIGHT: 70 IN | RESPIRATION RATE: 14 BRPM | SYSTOLIC BLOOD PRESSURE: 128 MMHG | DIASTOLIC BLOOD PRESSURE: 76 MMHG | BODY MASS INDEX: 30.92 KG/M2 | TEMPERATURE: 97.8 F

## 2021-02-12 DIAGNOSIS — Z11.59 ENCOUNTER FOR HEPATITIS C SCREENING TEST FOR LOW RISK PATIENT: ICD-10-CM

## 2021-02-12 DIAGNOSIS — Z13.29 SCREENING FOR THYROID DISORDER: ICD-10-CM

## 2021-02-12 DIAGNOSIS — Z00.00 ENCOUNTER FOR HEALTH MAINTENANCE EXAMINATION IN ADULT: Primary | ICD-10-CM

## 2021-02-12 DIAGNOSIS — Z13.0 SCREENING FOR DEFICIENCY ANEMIA: ICD-10-CM

## 2021-02-12 DIAGNOSIS — Z13.1 SCREENING FOR DIABETES MELLITUS: ICD-10-CM

## 2021-02-12 DIAGNOSIS — Z13.220 SCREENING FOR LIPID DISORDERS: ICD-10-CM

## 2021-02-12 DIAGNOSIS — F41.9 ANXIETY: ICD-10-CM

## 2021-02-12 PROBLEM — E78.2 HYPERLIPIDEMIA, MIXED: Chronic | Status: ACTIVE | Noted: 2019-04-02

## 2021-02-12 PROCEDURE — 99396 PREV VISIT EST AGE 40-64: CPT | Performed by: FAMILY MEDICINE

## 2021-02-12 PROCEDURE — 99213 OFFICE O/P EST LOW 20 MIN: CPT | Performed by: FAMILY MEDICINE

## 2021-02-12 RX ORDER — ESCITALOPRAM OXALATE 10 MG/1
10 TABLET ORAL DAILY
Qty: 30 TABLET | Refills: 3 | Status: SHIPPED | OUTPATIENT
Start: 2021-02-12 | End: 2021-04-12

## 2021-02-12 NOTE — PROGRESS NOTES
Chief Complaint  Establish Care, Anxiety    Subjective          [place] to [facility]  History of Present Illness     Prior PCP Derick    CPE- Patient reporting that health is doing well overall.  Patient is here today for annual physical.  Trying to eat a healthier diet.  Intermittent exercise.      Labs: due for labs to be completed today  Due for vaccines: none today    Last colonoscopy: 2015 and on 10 year schedule.      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  Not taking anything for it.  Denies depression but endorses some poor concentration, poor sleep, fatigue, irritability.  Never been on antidepressant before.  He says he tried Xanax a long time ago which seemed to help but he only used it sparingly.        Review of Systems   Constitutional: Negative for activity change, appetite change, fatigue and fever.   HENT: Negative for ear pain, facial swelling and sore throat.    Eyes: Negative for discharge and itching.   Respiratory: Negative for cough, chest tightness, shortness of breath and wheezing.    Cardiovascular: Negative for chest pain and palpitations.   Gastrointestinal: Negative for abdominal distention, constipation and diarrhea.   Endocrine: Negative for polydipsia, polyphagia and polyuria.   Genitourinary: Negative for difficulty urinating and flank pain.   Musculoskeletal: Negative for arthralgias and back pain.   Skin: Negative for color change and rash.   Allergic/Immunologic: Negative for environmental allergies and food allergies.   Neurological: Negative for weakness and numbness.   Hematological: Negative for adenopathy. Does not bruise/bleed easily.   Psychiatric/Behavioral: Positive for decreased concentration and sleep disturbance. Negative for dysphoric mood. The patient is nervous/anxious.            Objective   Vital Signs:   BP  "128/76 (BP Location: Left arm, Patient Position: Sitting, Cuff Size: Adult)   Pulse 81   Temp 97.8 °F (36.6 °C)   Resp 14   Ht 177.8 cm (70\")   Wt 98 kg (216 lb)   SpO2 99%   BMI 30.99 kg/m²     Physical Exam  Vitals signs and nursing note reviewed.   Constitutional:       General: He is not in acute distress.     Appearance: Normal appearance.   HENT:      Right Ear: Tympanic membrane, ear canal and external ear normal.      Left Ear: Tympanic membrane, ear canal and external ear normal.      Nose: Nose normal.      Mouth/Throat:      Mouth: Mucous membranes are moist.   Eyes:      General:         Right eye: No discharge.         Left eye: No discharge.      Conjunctiva/sclera: Conjunctivae normal.   Neck:      Musculoskeletal: Neck supple.   Cardiovascular:      Rate and Rhythm: Normal rate and regular rhythm.      Pulses: Normal pulses.      Heart sounds: Normal heart sounds.   Pulmonary:      Effort: Pulmonary effort is normal.      Breath sounds: Normal breath sounds.   Abdominal:      General: Bowel sounds are normal. There is no distension.      Palpations: Abdomen is soft.      Tenderness: There is no abdominal tenderness.   Musculoskeletal:      Right lower leg: No edema.      Left lower leg: No edema.   Lymphadenopathy:      Cervical: No cervical adenopathy.   Skin:     General: Skin is warm.      Findings: No rash.   Neurological:      General: No focal deficit present.      Mental Status: He is alert. Mental status is at baseline.   Psychiatric:         Attention and Perception: He is inattentive.         Mood and Affect: Mood is anxious.         Speech: Speech normal.         Behavior: Behavior normal. Behavior is cooperative.         Thought Content: Thought content normal.         Judgment: Judgment normal.        Result Review :                 Assessment and Plan    Diagnoses and all orders for this visit:    1. Encounter for health maintenance examination in adult (Primary)  -     CBC (No " Diff)  -     Comprehensive Metabolic Panel  -     Lipid Panel  -     TSH Rfx On Abnormal To Free T4  -     Hepatitis C antibody    2. Screening for deficiency anemia  -     CBC (No Diff)    3. Screening for diabetes mellitus  -     Comprehensive Metabolic Panel    4. Screening for lipid disorders  -     Lipid Panel    5. Screening for thyroid disorder  -     TSH Rfx On Abnormal To Free T4    6. Encounter for hepatitis C screening test for low risk patient  -     Hepatitis C antibody    7. Anxiety  -     escitalopram (Lexapro) 10 MG tablet; Take 1 tablet by mouth Daily.  Dispense: 30 tablet; Refill: 3             We did brief counseling regarding the patient's BMI > 30.  We discussed healthy ways to eat, movement/exercise plan, and watching weight at home.  We will continue to visit this in the future.    The patient was counseled regarding nutrition, physical activity, healthy weight, injury prevention, misuse of tobacco, alcohol and illicit drugs, sexual behavior and STI's, contraception, dental health, mental health, immunizations, and screenings.    Additional 10245 code:  New problem with the anxiety which is un.  I do not need to review anything for this problem.  I did do medication management as I added Escitalopram for this problem.      Follow Up   Return in about 2 months (around 4/12/2021) for Recheck - anxiety.  Patient was given instructions and counseling regarding his condition or for health maintenance advice. Please see specific information pulled into the AVS if appropriate.

## 2021-02-12 NOTE — PATIENT INSTRUCTIONS

## 2021-02-13 LAB
ALBUMIN SERPL-MCNC: 4.6 G/DL (ref 4–5)
ALBUMIN/GLOB SERPL: 1.6 {RATIO} (ref 1.2–2.2)
ALP SERPL-CCNC: 67 IU/L (ref 39–117)
ALT SERPL-CCNC: 139 IU/L (ref 0–44)
AST SERPL-CCNC: 94 IU/L (ref 0–40)
BILIRUB SERPL-MCNC: 0.4 MG/DL (ref 0–1.2)
BUN SERPL-MCNC: 19 MG/DL (ref 6–24)
BUN/CREAT SERPL: 18 (ref 9–20)
CALCIUM SERPL-MCNC: 9.9 MG/DL (ref 8.7–10.2)
CHLORIDE SERPL-SCNC: 101 MMOL/L (ref 96–106)
CHOLEST SERPL-MCNC: 249 MG/DL (ref 100–199)
CO2 SERPL-SCNC: 25 MMOL/L (ref 20–29)
CREAT SERPL-MCNC: 1.06 MG/DL (ref 0.76–1.27)
ERYTHROCYTE [DISTWIDTH] IN BLOOD BY AUTOMATED COUNT: 12.3 % (ref 11.6–15.4)
GLOBULIN SER CALC-MCNC: 2.8 G/DL (ref 1.5–4.5)
GLUCOSE SERPL-MCNC: 95 MG/DL (ref 65–99)
HCT VFR BLD AUTO: 48.8 % (ref 37.5–51)
HCV AB S/CO SERPL IA: <0.1 S/CO RATIO (ref 0–0.9)
HDLC SERPL-MCNC: 41 MG/DL
HGB BLD-MCNC: 17 G/DL (ref 13–17.7)
LDLC SERPL CALC-MCNC: 169 MG/DL (ref 0–99)
MCH RBC QN AUTO: 31.5 PG (ref 26.6–33)
MCHC RBC AUTO-ENTMCNC: 34.8 G/DL (ref 31.5–35.7)
MCV RBC AUTO: 90 FL (ref 79–97)
PLATELET # BLD AUTO: 196 X10E3/UL (ref 150–450)
POTASSIUM SERPL-SCNC: 4.8 MMOL/L (ref 3.5–5.2)
PROT SERPL-MCNC: 7.4 G/DL (ref 6–8.5)
RBC # BLD AUTO: 5.4 X10E6/UL (ref 4.14–5.8)
SODIUM SERPL-SCNC: 140 MMOL/L (ref 134–144)
TRIGL SERPL-MCNC: 209 MG/DL (ref 0–149)
TSH SERPL DL<=0.005 MIU/L-ACNC: 2.1 UIU/ML (ref 0.45–4.5)
VLDLC SERPL CALC-MCNC: 39 MG/DL (ref 5–40)
WBC # BLD AUTO: 6.3 X10E3/UL (ref 3.4–10.8)

## 2021-02-18 DIAGNOSIS — E78.2 HYPERLIPIDEMIA, MIXED: Primary | Chronic | ICD-10-CM

## 2021-02-18 RX ORDER — ATORVASTATIN CALCIUM 20 MG/1
20 TABLET, FILM COATED ORAL DAILY
Qty: 30 TABLET | Refills: 3 | Status: SHIPPED | OUTPATIENT
Start: 2021-02-18 | End: 2021-06-01

## 2021-04-09 NOTE — PROGRESS NOTES
"Chief Complaint  Anxiety (2 month follow up )    Subjective          Esa Lozano presents to [facility]  History of Present Illness    Anxiety disorder-\"okay\".  Patient is not taking Lexapro anymore as he could not tolerate due to fatigue.  Denies depression, poor concentration, poor sleep, fatigue, irritability.  Working frequently as an  but work has been distracting as well as going fishing.    He is trying to eat more healthy due to the hyperlipidemia.  He will need labs next time when he comes  He is taking atorvastatin 20mg as prescribed.    Objective   Vital Signs:   /72 (BP Location: Left arm, Patient Position: Sitting, Cuff Size: Adult)   Pulse 86   Temp 98.6 °F (37 °C)   Resp 14   Ht 177.8 cm (70\")   Wt 97.5 kg (215 lb)   SpO2 97%   BMI 30.85 kg/m²     Physical Exam  Vitals and nursing note reviewed.   Constitutional:       General: He is not in acute distress.     Appearance: Normal appearance.   Cardiovascular:      Rate and Rhythm: Normal rate and regular rhythm.      Heart sounds: Normal heart sounds.   Pulmonary:      Effort: Pulmonary effort is normal.      Breath sounds: Normal breath sounds.   Neurological:      Mental Status: He is alert.   Psychiatric:         Mood and Affect: Mood normal.         Behavior: Behavior normal.         Thought Content: Thought content normal.         Judgment: Judgment normal.        Result Review :   The following data was reviewed by: Lucio Mcgrath MD on 04/12/2021:  Common labs    Common Labsle 2/12/21 2/12/21 2/12/21    1506 1506 1506   Glucose  95    BUN  19    Creatinine  1.06    eGFR Non  Am  84    eGFR African Am  97    Sodium  140    Potassium  4.8    Chloride  101    Calcium  9.9    Total Protein  7.4    Albumin  4.6    Total Bilirubin  0.4    Alkaline Phosphatase  67    AST (SGOT)  94 (A)    ALT (SGPT)  139 (A)    WBC 6.3     Hemoglobin 17.0     Hematocrit 48.8     Platelets 196     Total " Cholesterol   249 (A)   Triglycerides   209 (A)   HDL Cholesterol   41   LDL Cholesterol    169 (A)   (A) Abnormal value                      Assessment and Plan    Diagnoses and all orders for this visit:    1. Anxiety (Primary)    2. Hyperlipidemia, mixed      I am okay with him going with a trial of no medication and trying to manage his anxiety.  I think he is doing a good job with trying to get out and enjoy fishing and other activities to distract from the grind of his job.  I think he should continue on the atorvastatin 20 mg and we will recheck in 4 months as that would have been 6 months since his last cholesterol panel.        Follow Up   Return in about 4 months (around 8/12/2021) for Recheck - HLD.  Patient was given instructions and counseling regarding his condition or for health maintenance advice. Please see specific information pulled into the AVS if appropriate.

## 2021-04-12 ENCOUNTER — OFFICE VISIT (OUTPATIENT)
Dept: INTERNAL MEDICINE | Facility: CLINIC | Age: 46
End: 2021-04-12

## 2021-04-12 VITALS
BODY MASS INDEX: 30.78 KG/M2 | SYSTOLIC BLOOD PRESSURE: 130 MMHG | RESPIRATION RATE: 14 BRPM | WEIGHT: 215 LBS | HEIGHT: 70 IN | OXYGEN SATURATION: 97 % | TEMPERATURE: 98.6 F | DIASTOLIC BLOOD PRESSURE: 72 MMHG | HEART RATE: 86 BPM

## 2021-04-12 DIAGNOSIS — E78.2 HYPERLIPIDEMIA, MIXED: ICD-10-CM

## 2021-04-12 DIAGNOSIS — F41.9 ANXIETY: Primary | ICD-10-CM

## 2021-04-12 PROCEDURE — 99213 OFFICE O/P EST LOW 20 MIN: CPT | Performed by: FAMILY MEDICINE

## 2021-04-12 NOTE — PATIENT INSTRUCTIONS
"High Cholesterol    High cholesterol is a condition in which the blood has high levels of a white, waxy substance similar to fat (cholesterol). The liver makes all the cholesterol that the body needs. The human body needs small amounts of cholesterol to help build cells. A person gets extra or excess cholesterol from the food that he or she eats.  The blood carries cholesterol from the liver to the rest of the body. If you have high cholesterol, deposits (plaques) may build up on the walls of your arteries. Arteries are the blood vessels that carry blood away from your heart. These plaques make the arteries narrow and stiff.  Cholesterol plaques increase your risk for heart attack and stroke. Work with your health care provider to keep your cholesterol levels in a healthy range.  What increases the risk?  The following factors may make you more likely to develop this condition:  · Eating foods that are high in animal fat (saturated fat) or cholesterol.  · Being overweight.  · Not getting enough exercise.  · A family history of high cholesterol (familial hypercholesterolemia).  · Use of tobacco products.  · Having diabetes.  What are the signs or symptoms?  There are no symptoms of this condition.  How is this diagnosed?  This condition may be diagnosed based on the results of a blood test.  · If you are older than 20 years of age, your health care provider may check your cholesterol levels every 4-6 years.  · You may be checked more often if you have high cholesterol or other risk factors for heart disease.  The blood test for cholesterol measures:  · \"Bad\" cholesterol, or LDL cholesterol. This is the main type of cholesterol that causes heart disease. The desired level is less than 100 mg/dL.  · \"Good\" cholesterol, or HDL cholesterol. HDL helps protect against heart disease by cleaning the arteries and carrying the LDL to the liver for processing. The desired level for HDL is 60 mg/dL or higher.  · Triglycerides. " These are fats that your body can store or burn for energy. The desired level is less than 150 mg/dL.  · Total cholesterol. This measures the total amount of cholesterol in your blood and includes LDL, HDL, and triglycerides. The desired level is less than 200 mg/dL.  How is this treated?  This condition may be treated with:  · Diet changes. You may be asked to eat foods that have more fiber and less saturated fats or added sugar.  · Lifestyle changes. These may include regular exercise, maintaining a healthy weight, and quitting use of tobacco products.  · Medicines. These are given when diet and lifestyle changes have not worked. You may be prescribed a statin medicine to help lower your cholesterol levels.  Follow these instructions at home:  Eating and drinking    · Eat a healthy, balanced diet. This diet includes:  ? Daily servings of a variety of fresh, frozen, or canned fruits and vegetables.  ? Daily servings of whole grain foods that are rich in fiber.  ? Foods that are low in saturated fats and trans fats. These include poultry and fish without skin, lean cuts of meat, and low-fat dairy products.  ? A variety of fish, especially oily fish that contain omega-3 fatty acids. Aim to eat fish at least 2 times a week.  · Avoid foods and drinks that have added sugar.  · Use healthy cooking methods, such as roasting, grilling, broiling, baking, poaching, steaming, and stir-frying. Do not ross your food except for stir-frying.  Lifestyle    · Get regular exercise. Aim to exercise for a total of 150 minutes a week. Increase your activity level by doing activities such as gardening, walking, and taking the stairs.  · Do not use any products that contain nicotine or tobacco, such as cigarettes, e-cigarettes, and chewing tobacco. If you need help quitting, ask your health care provider.  General instructions  · Take over-the-counter and prescription medicines only as told by your health care provider.  · Keep all  "follow-up visits as told by your health care provider. This is important.  Where to find more information  · American Heart Association: www.heart.org  · National Heart, Lung, and Blood [place]: www.nhlbi.nih.gov  Contact a health care provider if:  · You have trouble achieving or maintaining a healthy diet or weight.  · You are starting an exercise program.  · You are unable to stop smoking.  Get help right away if:  · You have chest pain.  · You have trouble breathing.  · You have any symptoms of a stroke. \"BE FAST\" is an easy way to remember the main warning signs of a stroke:  ? B - Balance. Signs are dizziness, sudden trouble walking, or loss of balance.  ? E - Eyes. Signs are trouble seeing or a sudden change in vision.  ? F - Face. Signs are sudden weakness or numbness of the face, or the face or eyelid drooping on one side.  ? A - Arms. Signs are weakness or numbness in an arm. This happens suddenly and usually on one side of the body.  ? S - Speech. Signs are sudden trouble speaking, slurred speech, or trouble understanding what people say.  ? T - Time. Time to call emergency services. Write down what time symptoms started.  · You have other signs of a stroke, such as:  ? A sudden, severe headache with no known cause.  ? Nausea or vomiting.  ? Seizure.  These symptoms may represent a serious problem that is an emergency. Do not wait to see if the symptoms will go away. Get medical help right away. Call your local emergency services (911 in the [place] Do not drive yourself to the hospital.  Summary  · Cholesterol plaques increase your risk for heart attack and stroke. Work with your health care provider to keep your cholesterol levels in a healthy range.  · Eat a healthy, balanced diet, get regular exercise, and maintain a healthy weight.  · Do not use any products that contain nicotine or tobacco, such as cigarettes, e-cigarettes, and chewing tobacco.  · Get help right away if you have any symptoms of a " stroke.  This information is not intended to replace advice given to you by your health care provider. Make sure you discuss any questions you have with your health care provider.  Document Revised: 11/16/2020 Document Reviewed: 11/16/2020  Elsevier Patient Education © 2021 [employer]

## 2021-05-29 DIAGNOSIS — E78.2 HYPERLIPIDEMIA, MIXED: Chronic | ICD-10-CM

## 2021-06-01 RX ORDER — ATORVASTATIN CALCIUM 20 MG/1
TABLET, FILM COATED ORAL
Qty: 90 TABLET | Refills: 0 | Status: SHIPPED | OUTPATIENT
Start: 2021-06-01 | End: 2021-08-13 | Stop reason: SDUPTHER

## 2021-08-12 ENCOUNTER — OFFICE VISIT (OUTPATIENT)
Dept: INTERNAL MEDICINE | Facility: CLINIC | Age: 46
End: 2021-08-12

## 2021-08-12 VITALS
WEIGHT: 213 LBS | OXYGEN SATURATION: 98 % | HEART RATE: 75 BPM | HEIGHT: 70 IN | SYSTOLIC BLOOD PRESSURE: 142 MMHG | TEMPERATURE: 98.9 F | BODY MASS INDEX: 30.49 KG/M2 | DIASTOLIC BLOOD PRESSURE: 80 MMHG

## 2021-08-12 DIAGNOSIS — E78.2 HYPERLIPIDEMIA, MIXED: Primary | ICD-10-CM

## 2021-08-12 DIAGNOSIS — R74.8 ELEVATED LIVER ENZYMES: ICD-10-CM

## 2021-08-12 LAB
ALBUMIN SERPL-MCNC: 4.4 G/DL (ref 3.5–5.2)
ALBUMIN/GLOB SERPL: 1.8 G/DL
ALP SERPL-CCNC: 61 U/L (ref 39–117)
ALT SERPL-CCNC: 79 U/L (ref 1–41)
AST SERPL-CCNC: 44 U/L (ref 1–40)
BILIRUB SERPL-MCNC: 0.6 MG/DL (ref 0–1.2)
BUN SERPL-MCNC: 12 MG/DL (ref 6–20)
BUN/CREAT SERPL: 11.5 (ref 7–25)
CALCIUM SERPL-MCNC: 9.8 MG/DL (ref 8.6–10.5)
CHLORIDE SERPL-SCNC: 102 MMOL/L (ref 98–107)
CHOLEST SERPL-MCNC: 205 MG/DL (ref 0–200)
CO2 SERPL-SCNC: 26.6 MMOL/L (ref 22–29)
CREAT SERPL-MCNC: 1.04 MG/DL (ref 0.76–1.27)
GLOBULIN SER CALC-MCNC: 2.5 GM/DL
GLUCOSE SERPL-MCNC: 103 MG/DL (ref 65–99)
HDLC SERPL-MCNC: 34 MG/DL (ref 40–60)
LDLC SERPL CALC-MCNC: 133 MG/DL (ref 0–100)
POTASSIUM SERPL-SCNC: 4.6 MMOL/L (ref 3.5–5.2)
PROT SERPL-MCNC: 6.9 G/DL (ref 6–8.5)
SODIUM SERPL-SCNC: 137 MMOL/L (ref 136–145)
TRIGL SERPL-MCNC: 213 MG/DL (ref 0–150)
VLDLC SERPL CALC-MCNC: 38 MG/DL (ref 5–40)

## 2021-08-12 PROCEDURE — 99214 OFFICE O/P EST MOD 30 MIN: CPT | Performed by: FAMILY MEDICINE

## 2021-08-12 RX ORDER — METHYLPREDNISOLONE 4 MG/1
4 TABLET ORAL DAILY
COMMUNITY
Start: 2021-08-04 | End: 2021-12-13

## 2021-08-12 RX ORDER — CELECOXIB 200 MG/1
200 CAPSULE ORAL DAILY
COMMUNITY
Start: 2021-08-04 | End: 2021-12-13

## 2021-08-12 NOTE — PATIENT INSTRUCTIONS
"High Cholesterol    High cholesterol is a condition in which the blood has high levels of a white, waxy substance similar to fat (cholesterol). The liver makes all the cholesterol that the body needs. The human body needs small amounts of cholesterol to help build cells. A person gets extra or excess cholesterol from the food that he or she eats.  The blood carries cholesterol from the liver to the rest of the body. If you have high cholesterol, deposits (plaques) may build up on the walls of your arteries. Arteries are the blood vessels that carry blood away from your heart. These plaques make the arteries narrow and stiff.  Cholesterol plaques increase your risk for heart attack and stroke. Work with your health care provider to keep your cholesterol levels in a healthy range.  What increases the risk?  The following factors may make you more likely to develop this condition:  · Eating foods that are high in animal fat (saturated fat) or cholesterol.  · Being overweight.  · Not getting enough exercise.  · A family history of high cholesterol (familial hypercholesterolemia).  · Use of tobacco products.  · Having diabetes.  What are the signs or symptoms?  There are no symptoms of this condition.  How is this diagnosed?  This condition may be diagnosed based on the results of a blood test.  · If you are older than 20 years of age, your health care provider may check your cholesterol levels every 4-6 years.  · You may be checked more often if you have high cholesterol or other risk factors for heart disease.  The blood test for cholesterol measures:  · \"Bad\" cholesterol, or LDL cholesterol. This is the main type of cholesterol that causes heart disease. The desired level is less than 100 mg/dL.  · \"Good\" cholesterol, or HDL cholesterol. HDL helps protect against heart disease by cleaning the arteries and carrying the LDL to the liver for processing. The desired level for HDL is 60 mg/dL or higher.  · Triglycerides. " These are fats that your body can store or burn for energy. The desired level is less than 150 mg/dL.  · Total cholesterol. This measures the total amount of cholesterol in your blood and includes LDL, HDL, and triglycerides. The desired level is less than 200 mg/dL.  How is this treated?  This condition may be treated with:  · Diet changes. You may be asked to eat foods that have more fiber and less saturated fats or added sugar.  · Lifestyle changes. These may include regular exercise, maintaining a healthy weight, and quitting use of tobacco products.  · Medicines. These are given when diet and lifestyle changes have not worked. You may be prescribed a statin medicine to help lower your cholesterol levels.  Follow these instructions at home:  Eating and drinking    · Eat a healthy, balanced diet. This diet includes:  ? Daily servings of a variety of fresh, frozen, or canned fruits and vegetables.  ? Daily servings of whole grain foods that are rich in fiber.  ? Foods that are low in saturated fats and trans fats. These include poultry and fish without skin, lean cuts of meat, and low-fat dairy products.  ? A variety of fish, especially oily fish that contain omega-3 fatty acids. Aim to eat fish at least 2 times a week.  · Avoid foods and drinks that have added sugar.  · Use healthy cooking methods, such as roasting, grilling, broiling, baking, poaching, steaming, and stir-frying. Do not ross your food except for stir-frying.  Lifestyle    · Get regular exercise. Aim to exercise for a total of 150 minutes a week. Increase your activity level by doing activities such as gardening, walking, and taking the stairs.  · Do not use any products that contain nicotine or tobacco, such as cigarettes, e-cigarettes, and chewing tobacco. If you need help quitting, ask your health care provider.  General instructions  · Take over-the-counter and prescription medicines only as told by your health care provider.  · Keep all  "follow-up visits as told by your health care provider. This is important.  Where to find more information  · American Heart Association: www.heart.org  · National Heart, Lung, and Blood [place]: www.nhlbi.nih.gov  Contact a health care provider if:  · You have trouble achieving or maintaining a healthy diet or weight.  · You are starting an exercise program.  · You are unable to stop smoking.  Get help right away if:  · You have chest pain.  · You have trouble breathing.  · You have any symptoms of a stroke. \"BE FAST\" is an easy way to remember the main warning signs of a stroke:  ? B - Balance. Signs are dizziness, sudden trouble walking, or loss of balance.  ? E - Eyes. Signs are trouble seeing or a sudden change in vision.  ? F - Face. Signs are sudden weakness or numbness of the face, or the face or eyelid drooping on one side.  ? A - Arms. Signs are weakness or numbness in an arm. This happens suddenly and usually on one side of the body.  ? S - Speech. Signs are sudden trouble speaking, slurred speech, or trouble understanding what people say.  ? T - Time. Time to call emergency services. Write down what time symptoms started.  · You have other signs of a stroke, such as:  ? A sudden, severe headache with no known cause.  ? Nausea or vomiting.  ? Seizure.  These symptoms may represent a serious problem that is an emergency. Do not wait to see if the symptoms will go away. Get medical help right away. Call your local emergency services (911 in the [place] Do not drive yourself to the hospital.  Summary  · Cholesterol plaques increase your risk for heart attack and stroke. Work with your health care provider to keep your cholesterol levels in a healthy range.  · Eat a healthy, balanced diet, get regular exercise, and maintain a healthy weight.  · Do not use any products that contain nicotine or tobacco, such as cigarettes, e-cigarettes, and chewing tobacco.  · Get help right away if you have any symptoms of a " stroke.  This information is not intended to replace advice given to you by your health care provider. Make sure you discuss any questions you have with your health care provider.  Document Revised: 11/16/2020 Document Reviewed: 11/16/2020  Elsevier Patient Education © 2021 [employer]

## 2021-08-12 NOTE — PROGRESS NOTES
"Chief Complaint  Follow-up (4 month follow up )    Subjective          Esa Lozano presents to [facility]  History of Present Illness     HLD-recently put on atorvastatin 20 mg due to some very elevated cholesterol (poorly controlled) levels back in February.  Patient taking medication as prescribed until about 3 weeks ago.      Also had some elevated liver enzymes at that time in February. This was attributed to his very elevated high cholesterol level. Planning on rechecking the cholesterol and CMP today to check on stability of both issues.      Objective   Vital Signs:   /80 (BP Location: Left arm, Patient Position: Sitting, Cuff Size: Adult)   Pulse 75   Temp 98.9 °F (37.2 °C)   Ht 177.8 cm (70\")   Wt 96.6 kg (213 lb)   SpO2 98%   BMI 30.56 kg/m²     Physical Exam  Vitals and nursing note reviewed.   Constitutional:       General: He is not in acute distress.     Appearance: Normal appearance.   Cardiovascular:      Rate and Rhythm: Normal rate and regular rhythm.      Heart sounds: Normal heart sounds. No murmur heard.     Pulmonary:      Effort: Pulmonary effort is normal.      Breath sounds: Normal breath sounds.   Neurological:      Mental Status: He is alert.        Result Review :   The following data was reviewed by: Lucio Mcgrath MD on 08/12/2021:  Common labs    Common Labsle 2/12/21 2/12/21 2/12/21    1506 1506 1506   Glucose  95    BUN  19    Creatinine  1.06    eGFR Non  Am  84    eGFR African Am  97    Sodium  140    Potassium  4.8    Chloride  101    Calcium  9.9    Total Protein  7.4    Albumin  4.6    Total Bilirubin  0.4    Alkaline Phosphatase  67    AST (SGOT)  94 (A)    ALT (SGPT)  139 (A)    WBC 6.3     Hemoglobin 17.0     Hematocrit 48.8     Platelets 196     Total Cholesterol   249 (A)   Triglycerides   209 (A)   HDL Cholesterol   41   LDL Cholesterol    169 (A)   (A) Abnormal value                      Assessment and Plan  "   Diagnoses and all orders for this visit:    1. Hyperlipidemia, mixed (Primary)  -     Comprehensive Metabolic Panel  -     Lipid Panel    2. Elevated liver enzymes  -     Comprehensive Metabolic Panel      Plan as above.  Labs today.  Continue atorvastatin 20 mg as above.  Possible dosage change if levels not improved.      Follow Up   Return in about 6 months (around 2/21/2022) for Annual physical.  Patient was given instructions and counseling regarding his condition or for health maintenance advice. Please see specific information pulled into the AVS if appropriate.

## 2021-08-13 DIAGNOSIS — E78.2 HYPERLIPIDEMIA, MIXED: Chronic | ICD-10-CM

## 2021-08-13 RX ORDER — ATORVASTATIN CALCIUM 40 MG/1
40 TABLET, FILM COATED ORAL DAILY
Qty: 90 TABLET | Refills: 3 | Status: SHIPPED | OUTPATIENT
Start: 2021-08-13 | End: 2022-08-01

## 2021-08-31 ENCOUNTER — OFFICE VISIT (OUTPATIENT)
Dept: ORTHOPEDIC SURGERY | Facility: CLINIC | Age: 46
End: 2021-08-31

## 2021-08-31 VITALS — TEMPERATURE: 95.7 F | BODY MASS INDEX: 30.64 KG/M2 | WEIGHT: 214 LBS | HEIGHT: 70 IN

## 2021-08-31 DIAGNOSIS — M17.0 PRIMARY OSTEOARTHRITIS OF BOTH KNEES: ICD-10-CM

## 2021-08-31 DIAGNOSIS — R52 PAIN: Primary | ICD-10-CM

## 2021-08-31 PROCEDURE — 73562 X-RAY EXAM OF KNEE 3: CPT | Performed by: ORTHOPAEDIC SURGERY

## 2021-08-31 PROCEDURE — 99213 OFFICE O/P EST LOW 20 MIN: CPT | Performed by: ORTHOPAEDIC SURGERY

## 2021-08-31 PROCEDURE — 20610 DRAIN/INJ JOINT/BURSA W/O US: CPT | Performed by: ORTHOPAEDIC SURGERY

## 2021-08-31 RX ORDER — METHYLPREDNISOLONE ACETATE 80 MG/ML
80 INJECTION, SUSPENSION INTRA-ARTICULAR; INTRALESIONAL; INTRAMUSCULAR; SOFT TISSUE
Status: COMPLETED | OUTPATIENT
Start: 2021-08-31 | End: 2021-08-31

## 2021-08-31 RX ADMIN — METHYLPREDNISOLONE ACETATE 80 MG: 80 INJECTION, SUSPENSION INTRA-ARTICULAR; INTRALESIONAL; INTRAMUSCULAR; SOFT TISSUE at 09:50

## 2021-08-31 NOTE — PROGRESS NOTES
"Patient: Esa Lozano  YOB: 1975 45 y.o. male  Medical Record Number: [id number]    Chief Complaints:   Chief Complaint   Patient presents with   • Right Knee - Follow-up       History of Present Illness:Esa Lozano is a 45 y.o. male who presents for follow-up of bilateral knees.  Having soreness about the medial aspect of both knees has had previous gel injection in the right knee without much relief but the left knee is also becoming sore medially with weightbearing activity.  Cannot walk as far as he wants states less than a mile or so.  Has done physical therapy and is working on home program currently.    Allergies:   Allergies   Allergen Reactions   • Gluten Meal        Medications:   Current Outpatient Medications   Medication Sig Dispense Refill   • atorvastatin (LIPITOR) 40 MG tablet Take 1 tablet by mouth Daily. 90 tablet 3   • lansoprazole (PREVACID) 30 MG capsule Take 1 capsule by mouth Daily. For GERD 90 capsule 3   • rizatriptan (MAXALT) 10 MG tablet Take 1 tablet by mouth 1 (One) Time As Needed for Migraine for up to 1 dose. May repeat in 2 hours if needed 6 tablet 0   • celecoxib (CeleBREX) 200 MG capsule Take 200 mg by mouth Daily. with food     • methylPREDNISolone (MEDROL) 4 MG dose pack Take 4 mg by mouth Daily.       No current facility-administered medications for this visit.         The following portions of the patient's history were reviewed and updated as appropriate: allergies, current medications, past family history, past medical history, past social history, past surgical history and problem list.    Review of Systems:   A 14 point review of systems was performed. All systems negative except pertinent positives/negative listed in HPI above    Physical Exam:   Vitals:    08/31/21 0918   Temp: 95.7 °F (35.4 °C)   Weight: 97.1 kg (214 lb)   Height: 177.8 cm (70\")       General: A and O x 3, ASA, NAD    SCLERA:    Normal    DENTITION:   Normal  Knee:  " bilateral    ALIGNMENT:    neutral to slight varus  ,   Patella  tracks  midline    GAIT:   Slight antalgic    SKIN:    No abnormality    RANGE OF MOTION:   3-130   DEG    STRENGTH:   4+  / 5    LIGAMENTS:    No varus / valgus instability.   Negative  Lachman.    MENISCUS:     Negative   Esdras       DISTAL PULSES:    Paplable    DISTAL SENSATION :   Intact    LYMPHATICS:     No   lymphadenopathy    OTHER:          - Positive trace effusion      -Mild medial crepitance with ROM       Radiology:  Xrays 3views both knees (ap,lateral, sunrise) were ordered and reviewed for evaluation of knee pain demonstratingmild joint space narrowing right greater than left medial  todays xrays were compared to previous xrays and demonstrate no change    Assessment/Plan:  Right greater left knee medial osteoarthritis.  I injected both knees as below.  We had a goal for 15 pounds weight loss.  I discussed some strengthening exercises which he should add to his regimen.  I will see him back as needed.  Should he need further injections we can have him see Deisy or Amado in the future for injections  Large Joint Arthrocentesis: R knee  Date/Time: 8/31/2021 9:50 AM  Consent given by: patient  Site marked: site marked  Timeout: Immediately prior to procedure a time out was called to verify the correct patient, procedure, equipment, support staff and site/side marked as required   Supporting Documentation  Indications: pain and joint swelling   Procedure Details  Location: knee - R knee  Preparation: Patient was prepped and draped in the usual sterile fashion  Needle size: 22 G  Approach: anterolateral  Medications administered: 80 mg methylPREDNISolone acetate 80 MG/ML; 2 mL lidocaine (cardiac)  Patient tolerance: patient tolerated the procedure well with no immediate complications    Large Joint Arthrocentesis: L knee  Date/Time: 8/31/2021 9:50 AM  Consent given by: patient  Site marked: site marked  Timeout: Immediately prior to  procedure a time out was called to verify the correct patient, procedure, equipment, support staff and site/side marked as required   Supporting Documentation  Indications: pain and joint swelling   Procedure Details  Location: knee - L knee  Preparation: Patient was prepped and draped in the usual sterile fashion  Needle size: 22 G  Approach: anterolateral  Medications administered: 80 mg methylPREDNISolone acetate 80 MG/ML; 2 mL lidocaine (cardiac)  Patient tolerance: patient tolerated the procedure well with no immediate complications

## 2021-12-13 ENCOUNTER — APPOINTMENT (OUTPATIENT)
Dept: GENERAL RADIOLOGY | Facility: HOSPITAL | Age: 46
End: 2021-12-13

## 2021-12-13 ENCOUNTER — TELEPHONE (OUTPATIENT)
Dept: INTERNAL MEDICINE | Facility: CLINIC | Age: 46
End: 2021-12-13

## 2021-12-13 ENCOUNTER — HOSPITAL ENCOUNTER (EMERGENCY)
Facility: HOSPITAL | Age: 46
Discharge: HOME | End: 2021-12-13
Attending: EMERGENCY MEDICINE | Admitting: EMERGENCY MEDICINE

## 2021-12-13 VITALS
HEIGHT: 70 IN | TEMPERATURE: 99.3 F | BODY MASS INDEX: 29.2 KG/M2 | OXYGEN SATURATION: 98 % | RESPIRATION RATE: 18 BRPM | SYSTOLIC BLOOD PRESSURE: 141 MMHG | DIASTOLIC BLOOD PRESSURE: 99 MMHG | HEART RATE: 73 BPM | WEIGHT: 204 LBS

## 2021-12-13 DIAGNOSIS — R07.9 CHEST PAIN, UNSPECIFIED TYPE: Primary | ICD-10-CM

## 2021-12-13 DIAGNOSIS — E78.00 HYPERCHOLESTEREMIA: ICD-10-CM

## 2021-12-13 LAB
ALBUMIN SERPL-MCNC: 4.5 G/DL (ref 3.5–5.2)
ALBUMIN/GLOB SERPL: 1.5 G/DL
ALP SERPL-CCNC: 58 U/L (ref 39–117)
ALT SERPL W P-5'-P-CCNC: 83 U/L (ref 1–41)
ANION GAP SERPL CALCULATED.3IONS-SCNC: 9.5 MMOL/L (ref 5–15)
AST SERPL-CCNC: 48 U/L (ref 1–40)
BASOPHILS # BLD AUTO: 0.06 10*3/MM3 (ref 0–0.2)
BASOPHILS NFR BLD AUTO: 0.7 % (ref 0–1.5)
BILIRUB SERPL-MCNC: 0.6 MG/DL (ref 0–1.2)
BUN SERPL-MCNC: 15 MG/DL (ref 6–20)
BUN/CREAT SERPL: 16.3 (ref 7–25)
CALCIUM SPEC-SCNC: 9.9 MG/DL (ref 8.6–10.5)
CHLORIDE SERPL-SCNC: 101 MMOL/L (ref 98–107)
CO2 SERPL-SCNC: 27.5 MMOL/L (ref 22–29)
CREAT SERPL-MCNC: 0.92 MG/DL (ref 0.76–1.27)
DEPRECATED RDW RBC AUTO: 38.8 FL (ref 37–54)
EOSINOPHIL # BLD AUTO: 0.09 10*3/MM3 (ref 0–0.4)
EOSINOPHIL NFR BLD AUTO: 1.1 % (ref 0.3–6.2)
ERYTHROCYTE [DISTWIDTH] IN BLOOD BY AUTOMATED COUNT: 11.9 % (ref 12.3–15.4)
GFR SERPL CREATININE-BSD FRML MDRD: 89 ML/MIN/1.73
GLOBULIN UR ELPH-MCNC: 3.1 GM/DL
GLUCOSE SERPL-MCNC: 104 MG/DL (ref 65–99)
HCT VFR BLD AUTO: 47.1 % (ref 37.5–51)
HGB BLD-MCNC: 16.7 G/DL (ref 13–17.7)
HOLD SPECIMEN: NORMAL
HOLD SPECIMEN: NORMAL
IMM GRANULOCYTES # BLD AUTO: 0.02 10*3/MM3 (ref 0–0.05)
IMM GRANULOCYTES NFR BLD AUTO: 0.2 % (ref 0–0.5)
LYMPHOCYTES # BLD AUTO: 2.11 10*3/MM3 (ref 0.7–3.1)
LYMPHOCYTES NFR BLD AUTO: 25.4 % (ref 19.6–45.3)
MCH RBC QN AUTO: 31.7 PG (ref 26.6–33)
MCHC RBC AUTO-ENTMCNC: 35.5 G/DL (ref 31.5–35.7)
MCV RBC AUTO: 89.4 FL (ref 79–97)
MONOCYTES # BLD AUTO: 0.54 10*3/MM3 (ref 0.1–0.9)
MONOCYTES NFR BLD AUTO: 6.5 % (ref 5–12)
NEUTROPHILS NFR BLD AUTO: 5.48 10*3/MM3 (ref 1.7–7)
NEUTROPHILS NFR BLD AUTO: 66.1 % (ref 42.7–76)
NRBC BLD AUTO-RTO: 0 /100 WBC (ref 0–0.2)
PLATELET # BLD AUTO: 191 10*3/MM3 (ref 140–450)
PMV BLD AUTO: 10 FL (ref 6–12)
POTASSIUM SERPL-SCNC: 4.7 MMOL/L (ref 3.5–5.2)
PROT SERPL-MCNC: 7.6 G/DL (ref 6–8.5)
QT INTERVAL: 368 MS
RBC # BLD AUTO: 5.27 10*6/MM3 (ref 4.14–5.8)
SODIUM SERPL-SCNC: 138 MMOL/L (ref 136–145)
TROPONIN T SERPL-MCNC: <0.01 NG/ML (ref 0–0.03)
TROPONIN T SERPL-MCNC: <0.01 NG/ML (ref 0–0.03)
WBC NRBC COR # BLD: 8.3 10*3/MM3 (ref 3.4–10.8)
WHOLE BLOOD HOLD SPECIMEN: NORMAL
WHOLE BLOOD HOLD SPECIMEN: NORMAL

## 2021-12-13 PROCEDURE — 99283 EMERGENCY DEPT VISIT LOW MDM: CPT

## 2021-12-13 PROCEDURE — 93010 ELECTROCARDIOGRAM REPORT: CPT | Performed by: INTERNAL MEDICINE

## 2021-12-13 PROCEDURE — 85025 COMPLETE CBC W/AUTO DIFF WBC: CPT

## 2021-12-13 PROCEDURE — 93005 ELECTROCARDIOGRAM TRACING: CPT | Performed by: EMERGENCY MEDICINE

## 2021-12-13 PROCEDURE — 80053 COMPREHEN METABOLIC PANEL: CPT

## 2021-12-13 PROCEDURE — 71046 X-RAY EXAM CHEST 2 VIEWS: CPT

## 2021-12-13 PROCEDURE — 36415 COLL VENOUS BLD VENIPUNCTURE: CPT

## 2021-12-13 PROCEDURE — 93005 ELECTROCARDIOGRAM TRACING: CPT

## 2021-12-13 PROCEDURE — 84484 ASSAY OF TROPONIN QUANT: CPT | Performed by: EMERGENCY MEDICINE

## 2021-12-13 PROCEDURE — 84484 ASSAY OF TROPONIN QUANT: CPT

## 2021-12-13 RX ORDER — SODIUM CHLORIDE 0.9 % (FLUSH) 0.9 %
10 SYRINGE (ML) INJECTION AS NEEDED
Status: DISCONTINUED | OUTPATIENT
Start: 2021-12-13 | End: 2021-12-13 | Stop reason: HOSPADM

## 2021-12-13 RX ORDER — ONDANSETRON 2 MG/ML
4 INJECTION INTRAMUSCULAR; INTRAVENOUS ONCE
Status: DISCONTINUED | OUTPATIENT
Start: 2021-12-13 | End: 2021-12-13

## 2021-12-13 RX ORDER — ASPIRIN 325 MG
325 TABLET ORAL ONCE
Status: COMPLETED | OUTPATIENT
Start: 2021-12-13 | End: 2021-12-13

## 2021-12-13 RX ORDER — MULTIVIT WITH MINERALS/LUTEIN
250 TABLET ORAL DAILY
COMMUNITY

## 2021-12-13 RX ORDER — ERGOCALCIFEROL (VITAMIN D2) 10 MCG
400 TABLET ORAL DAILY
COMMUNITY

## 2021-12-13 RX ADMIN — ASPIRIN 325 MG: 325 TABLET ORAL at 18:28

## 2021-12-13 NOTE — ED NOTES
Intermittent CP x1 month. Denies dizziness, N/V    Patient was placed in face mask during triage process. Patient was wearing facemask when I entered the room and throughout our encounter. I wore full protective equipment throughout this patient encounter including a face mask, eye protection, and gloves. Hand hygiene was performed before donning protective equipment and again following doffing of PPE after leaving the room.       Lynette Alexander RN  12/13/21 1270

## 2021-12-13 NOTE — ED PROVIDER NOTES
EMERGENCY DEPARTMENT ENCOUNTER    Room Number:  B03/03  Date of encounter:  12/13/2021  PCP: Lucio Mcgrath MD  Historian: Patient      I used full protective equipment while examining this patient.  This includes face mask, gloves and protective eyewear.  I washed my hands before entering the room and immediately upon leaving the room      HPI:  Chief Complaint: Chest pain  A complete HPI/ROS/PMH/PSH/SH/FH are unobtainable due to: Nothing    Context: Esa Lozano is a 46 y.o. male who presents to the ED c/o intermittent chest pain x1 month.  Patient describes a sharp, stabbing pain intermittently over the right anterior chest and occasionally over the left anterior chest.  The pain comes and goes without precipitating alleviating effects.  He certainly denies any exertional or pleuritic component.  He states perhaps stress worsens his pain.  At times he will feel like he cannot catch a full breath.  He denies any nausea, vomiting, diaphoresis.  He denies any concerning family history of heart problems.  He does have a history of hypercholesterolemia.  He quit smoking approximately 15 years ago.  Patient admits he is an anxious person.    Review of Medical Records  I reviewed patient's last office visit from 8/12/2021.  Patient being treated for hyperlipidemia, elevated LFTs.    PAST MEDICAL HISTORY  Active Ambulatory Problems     Diagnosis Date Noted   • Generalized anxiety disorder 11/21/2015   • Migraine 11/21/2015   • GERD (gastroesophageal reflux disease) 10/05/2016   • Chronic pain of right knee 02/13/2017   • Chronic pain of both knees 12/12/2017   • Patellofemoral arthritis of right knee 12/12/2017   • Patellofemoral arthralgia of left knee 12/12/2017   • Chondral defect of condyle of right femur 01/12/2018   • Chondral defect of right patella 01/12/2018   • Status post arthroscopy of right knee 01/12/2018   • Status post arthroscopic partial medial meniscectomy 01/12/2018   • Hyperlipidemia, mixed  04/02/2019   • Encounter for health maintenance examination in adult 02/12/2021     Resolved Ambulatory Problems     Diagnosis Date Noted   • Internal derangement of knee joint, right 12/12/2017   • Complex tear of medial meniscus of right knee as current injury 12/27/2017     Past Medical History:   Diagnosis Date   • Allergic    • Arthritis    • SAKINA (generalized anxiety disorder)    • H/O chest x-ray 08/20/2015   • Hypercholesterolemia    • Right knee pain          PAST SURGICAL HISTORY  Past Surgical History:   Procedure Laterality Date   • HAND SURGERY  05/2011    1st digit left hand, accidently cut with a chain saw, 26 stitches. LOCAL ONLY   • KNEE ARTHROSCOPY Right 1/2/2018    Procedure: RIGHT KNEE ARTHROSCOPY, PARTIAL MEDIAL MENISECTOMY, EXTENSIVE CHONDROPLASTY AND MICRO FRACTURE, REMOVAL OF 8 LOOSE BODIES ;  Surgeon: Ihsan Fontanez MD;  [place] [facility] [place] [facility] [place]:    • KNEE SURGERY Right 01/02/2018    arthroscopy   • TRANSESOPHAGEAL ECHOCARDIOGRAM (JERI)  08/21/2015    [facility]         FAMILY HISTORY  Family History   Problem Relation Age of Onset   • Alcohol abuse Other    • Arthritis Other    • Cancer Other    • Diabetes Other    • Migraines Other    • Diabetes Mother    • Migraines Mother          SOCIAL HISTORY  Social History     Socioeconomic History   • Marital status:    Tobacco Use   • Smoking status: Former Smoker     Packs/day: 1.00     Years: 18.00     Pack years: 18.00     Types: Cigarettes   • Smokeless tobacco: Never Used   • Tobacco comment: QUIT 13 YRS AGO   Substance and Sexual Activity   • Alcohol use: Yes     Comment: RARE   • Drug use: No   • Sexual activity: Defer         ALLERGIES  Gluten meal        REVIEW OF SYSTEMS  All systems reviewed and negative except for those discussed in HPI.       PHYSICAL EXAM    I have reviewed the triage vital signs and nursing notes.    ED Triage Vitals   Temp Heart Rate Resp BP SpO2   12/13/21 1217 12/13/21 1217 12/13/21  1217 12/13/21 1348 12/13/21 1217   99.3 °F (37.4 °C) 97 18 136/91 97 %      Temp src Heart Rate Source Patient Position BP Location FiO2 (%)   -- -- -- -- --              Physical Exam  GENERAL: Alert, oriented, not distressed  HENT: head atraumatic, no nuchal rigidity  EYES: no scleral icterus, EOMI  CV: regular rhythm, regular rate, no murmur  RESPIRATORY: normal effort, CTA.  No chest wall tenderness.    ABDOMEN: soft, nontender  MUSCULOSKELETAL: no deformity, FROM, no calf swelling or tenderness  NEURO: alert, moves all extremities, follows commands  SKIN: warm, dry        LAB RESULTS  Recent Results (from the past 24 hour(s))   ECG 12 Lead    Collection Time: 12/13/21 12:25 PM   Result Value Ref Range    QT Interval 368 ms   Comprehensive Metabolic Panel    Collection Time: 12/13/21  1:45 PM    Specimen: Blood   Result Value Ref Range    Glucose 104 (H) 65 - 99 mg/dL    BUN 15 6 - 20 mg/dL    Creatinine 0.92 0.76 - 1.27 mg/dL    Sodium 138 136 - 145 mmol/L    Potassium 4.7 3.5 - 5.2 mmol/L    Chloride 101 98 - 107 mmol/L    CO2 27.5 22.0 - 29.0 mmol/L    Calcium 9.9 8.6 - 10.5 mg/dL    Total Protein 7.6 6.0 - 8.5 g/dL    Albumin 4.50 3.50 - 5.20 g/dL    ALT (SGPT) 83 (H) 1 - 41 U/L    AST (SGOT) 48 (H) 1 - 40 U/L    Alkaline Phosphatase 58 39 - 117 U/L    Total Bilirubin 0.6 0.0 - 1.2 mg/dL    eGFR Non African Amer 89 >60 mL/min/1.73    Globulin 3.1 gm/dL    A/G Ratio 1.5 g/dL    BUN/Creatinine Ratio 16.3 7.0 - 25.0    Anion Gap 9.5 5.0 - 15.0 mmol/L   Troponin    Collection Time: 12/13/21  1:45 PM    Specimen: Blood   Result Value Ref Range    Troponin T <0.010 0.000 - 0.030 ng/mL   Green Top (Gel)    Collection Time: 12/13/21  1:45 PM   Result Value Ref Range    Extra Tube Hold for add-ons.    Lavender Top    Collection Time: 12/13/21  1:45 PM   Result Value Ref Range    Extra Tube hold for add-on    Gold Top - SST    Collection Time: 12/13/21  1:45 PM   Result Value Ref Range    Extra Tube Hold for add-ons.     Light Blue Top    Collection Time: 12/13/21  1:45 PM   Result Value Ref Range    Extra Tube hold for add-on    CBC Auto Differential    Collection Time: 12/13/21  1:45 PM    Specimen: Blood   Result Value Ref Range    WBC 8.30 3.40 - 10.80 10*3/mm3    RBC 5.27 4.14 - 5.80 10*6/mm3    Hemoglobin 16.7 13.0 - 17.7 g/dL    Hematocrit 47.1 37.5 - 51.0 %    MCV 89.4 79.0 - 97.0 fL    MCH 31.7 26.6 - 33.0 pg    MCHC 35.5 31.5 - 35.7 g/dL    RDW 11.9 (L) 12.3 - 15.4 %    RDW-SD 38.8 37.0 - 54.0 fl    MPV 10.0 6.0 - 12.0 fL    Platelets 191 140 - 450 10*3/mm3    Neutrophil % 66.1 42.7 - 76.0 %    Lymphocyte % 25.4 19.6 - 45.3 %    Monocyte % 6.5 5.0 - 12.0 %    Eosinophil % 1.1 0.3 - 6.2 %    Basophil % 0.7 0.0 - 1.5 %    Immature Grans % 0.2 0.0 - 0.5 %    Neutrophils, Absolute 5.48 1.70 - 7.00 10*3/mm3    Lymphocytes, Absolute 2.11 0.70 - 3.10 10*3/mm3    Monocytes, Absolute 0.54 0.10 - 0.90 10*3/mm3    Eosinophils, Absolute 0.09 0.00 - 0.40 10*3/mm3    Basophils, Absolute 0.06 0.00 - 0.20 10*3/mm3    Immature Grans, Absolute 0.02 0.00 - 0.05 10*3/mm3    nRBC 0.0 0.0 - 0.2 /100 WBC   Troponin    Collection Time: 12/13/21  6:29 PM    Specimen: Arm, Right; Blood   Result Value Ref Range    Troponin T <0.010 0.000 - 0.030 ng/mL       Ordered the above labs and independently reviewed the results.        RADIOLOGY  XR Chest 2 View    Result Date: 12/13/2021  XR CHEST 2 VW-  HISTORY: Male who is 46 years-old,  chest pain  TECHNIQUE: Frontal and lateral views of the chest  COMPARISON: 8/20/2015  FINDINGS: Heart, mediastinum and pulmonary vasculature are unremarkable. No focal pulmonary consolidation, pleural effusion, or pneumothorax. Old granulomatous disease is seen. No acute osseous process.      No evidence for acute pulmonary process. Follow-up as clinical indications persist.  This report was finalized on 12/13/2021 2:39 PM by Dr. John Vo M.D.        I ordered the above noted radiological studies. Reviewed by  me and discussed with radiologist.  See dictation for official radiology interpretation.      MEDICATIONS GIVEN IN ER    Medications   sodium chloride 0.9 % flush 10 mL (has no administration in time range)   aspirin tablet 325 mg (325 mg Oral Given 12/13/21 1828)         PROGRESS, DATA ANALYSIS, CONSULTS, AND MEDICAL DECISION MAKING    All labs have been independently reviewed by me.  All radiology studies have been reviewed by me and discussed with radiologist dictating the report.   EKG's independently viewed and interpreted by me.  Discussion below represents my analysis of pertinent findings related to patient's condition, differential diagnosis, treatment plan and final disposition.    I have discussed case with Dr. Tan, emergency room physician.  He has performed his own bedside examination and agrees with treatment plan.    ED Course as of 12/13/21 1934   Mon Dec 13, 2021   1800 Patient presents with intermittent chest pain x1 month..  Patient's pain is fairly atypical to the right anterior and left anterior chest wall.  No exertional or pleuritic component.  Plan to check basic labs and reevaluate.  Differential diagnoses include not limited to ACS, GERD, pneumonia, anxiety. [EE]   1810 EKG interpreted myself.  Time 1225.  Sinus rhythm, 81 bpm.  Left atrial enlargement with normal RACHAEL.  QRS normal normal axis.  No significant ST abnormalities.  EKG similar to prior EKG from 8/20/2015.    Chest x-ray interpreted myself shows no acute infiltrate. [EE]   1811 Troponin T: <0.010 [EE]   1811 Hemoglobin: 16.7 [EE]   1811 HEART Score 2: +1 for age, +1 for risk factors (obesity, hypercholesterolemia)    PERC Negative [EE]   1924 Patient has 2 negative serial troponins.  Patient is asymptomatic at this time.  I have offered patient admission for further testing, however he states he rather do this outpatient.  I believe this is reasonable.  He is overall low risk. [EE]      ED Course User Index  [EE] Jerardo Will  JOLIE LAGUNA       AS OF 19:34 EST VITALS:    BP - 141/99  HR - 73  TEMP - 99.3 °F (37.4 °C)  O2 SATS - 98%        DIAGNOSIS  Final diagnoses:   Chest pain, unspecified type   Hypercholesteremia         DISPOSITION  Discharged           Jerardo Will PA  12/13/21 1934

## 2021-12-13 NOTE — TELEPHONE ENCOUNTER
Pt called c/o chest tightness-he has some anxiety issues in the past but didn't feel overly anxious at the time.  He has the same issue if he drinks too much coffee.  Last time this happened he went to the ER he was told it was anxiety so he prefers not to go back to the ER.      Ok to bring the pt in to be seen?   He would either have to be worked into your scheduled or see NP

## 2021-12-13 NOTE — TELEPHONE ENCOUNTER
I would recommend nurse practitioner.  I will be out tomorrow and Wednesday due to the death in my family

## 2021-12-14 ENCOUNTER — TELEPHONE (OUTPATIENT)
Dept: INTERNAL MEDICINE | Facility: CLINIC | Age: 46
End: 2021-12-14

## 2021-12-14 DIAGNOSIS — R07.89 OTHER CHEST PAIN: Primary | ICD-10-CM

## 2021-12-14 NOTE — TELEPHONE ENCOUNTER
[place] [facility] [place] and notified pts wife order had been sent

## 2021-12-14 NOTE — TELEPHONE ENCOUNTER
Caller: Amanda Lozano    Relationship: Emergency Contact    Best call back number: [phone] (M)    What is the medical concern/diagnosis: CHEST PAIN     What specialty or service is being requested: REFERRAL REQUEST FOR CARDIOLOGIST     What is the provider, practice or medical service name: UNKNOWN    What is the office location: UNKNOWN    What is the office phone number: UNKNOWN     Any additional details:     PATIENT WENT TO [facility] YESTERDAY FOR THE CHEST PAIN, FOR TREATMENT, THE HOSPITAL PERFORMED A EKG ON THE PATIENT, AND WANTED HIM TO MAKE AN APPOINTMENT WITH A CARDIOLOGIST- THEY WERE GIVEN  A PHONE NUMBER TO CALL TO SCHEDULE AN APPOINTMENT WITH A CARDIOLOGIST BUT THEY ARE NOT GETTING THROUGH AND HAVE TRIED TO CALL THIS PROVIDED NUMBER MORE THAN ONCE.   [phone] [facility] CARDIOLOGY      PATIENT AND WIFE HAVE TRIED TO GET THROUGH USING THIS NUMBER MORE THAN ONCE BUT HAVE BEEN UNSUCCESSFUL AND THEY WERE ADVISED TO GET A STRESS TEST DONE.    PATIENT'S WIFE KNOWS DR KEBEDE IS OUT FOR ANOTHER FEW WEEKS AND WANTS TO KNOW IF THIS IS SOMETHING THAT THE CLINICAL STAFF CAN HELP THEM WITH.    PLEASE CALL AND ADVISE.

## 2022-01-13 NOTE — PROGRESS NOTES
RM:________    Referral Provider: Deisy Winn MD Wells, Michael J, MD    NEW PATIENT/ CONSULT  PREVIOUS CARDIOLOGIST:   CARDIAC TESTING:     : 1975   AGE: 46 y.o.    2022  REASON FOR VISIT/  CC:      WT: ____________ BP: __________L __________R/ HR_______________    CHEST PAIN: _____________    SOA: ____________PALPS: __________      LIGHTHEADED: ___________ FATIGUE: _______________ EDEMA______________  ALLERGIES:  Gluten meal  SMOKING HISTORY  Social History     Tobacco Use   • Smoking status: Former Smoker     Packs/day: 1.00     Years: 18.00     Pack years: 18.00     Types: Cigarettes   • Smokeless tobacco: Never Used   • Tobacco comment: QUIT 13 YRS AGO   Substance Use Topics   • Alcohol use: Yes     Comment: RARE   • Drug use: No          CHILDREN: _______________________       CAFFEINE USE________  ALCOHOL _____________  OCCUPATION_____________  Past Surgical History:   Procedure Laterality Date   • HAND SURGERY  2011    1st digit left hand, accidently cut with a chain saw, 26 stitches. LOCAL ONLY   • KNEE ARTHROSCOPY Right 2018    Procedure: RIGHT KNEE ARTHROSCOPY, PARTIAL MEDIAL MENISECTOMY, EXTENSIVE CHONDROPLASTY AND MICRO FRACTURE, REMOVAL OF 8 LOOSE BODIES ;  Surgeon: Ihsan Fontanez MD;  Location: [facility];  Service:    • KNEE SURGERY Right 2018    arthroscopy   • TRANSESOPHAGEAL ECHOCARDIOGRAM (JERI)  2015    [facility]                FAMILY HISTORY  HEART DISEASE  CHF  DIABETES  CARDIAC ARREST  STROKE  CANCER  ANEURYSM                                                             H/O: MI_____   STROKE________   GOUT_____   ANEMIA______     CAROTID________ HIV____ CAD_______ HYPERCHOL _____    H/O: CHF _____   RF____ DM___ HTN_______PVD____THYROID DISEASE_______    PMH: GI ____   HEPATITIS ___ KIDNEY DISEASE ___ LUNG DISEASE _______     SLEEP APNEA ____ BLOOD CLOTS ____ DVT ____ VEIN STRIPPING ___________     CANCER  _________________________________ CHEMO/ RADIATION__________

## 2022-01-17 ENCOUNTER — HOSPITAL ENCOUNTER (OUTPATIENT)
Dept: CARDIOLOGY | Facility: HOSPITAL | Age: 47
Discharge: HOME | End: 2022-01-17
Admitting: INTERNAL MEDICINE

## 2022-01-17 ENCOUNTER — OFFICE VISIT (OUTPATIENT)
Dept: CARDIOLOGY | Facility: CLINIC | Age: 47
End: 2022-01-17

## 2022-01-17 VITALS
BODY MASS INDEX: 30.38 KG/M2 | HEIGHT: 70 IN | SYSTOLIC BLOOD PRESSURE: 140 MMHG | WEIGHT: 212.2 LBS | DIASTOLIC BLOOD PRESSURE: 82 MMHG | HEART RATE: 80 BPM

## 2022-01-17 DIAGNOSIS — E78.2 HYPERLIPIDEMIA, MIXED: Chronic | ICD-10-CM

## 2022-01-17 DIAGNOSIS — R07.2 PRECORDIAL PAIN: Primary | ICD-10-CM

## 2022-01-17 LAB
BH CV STRESS BP STAGE 1: NORMAL
BH CV STRESS BP STAGE 2: NORMAL
BH CV STRESS BP STAGE 3: NORMAL
BH CV STRESS DURATION MIN STAGE 1: 3
BH CV STRESS DURATION MIN STAGE 2: 3
BH CV STRESS DURATION MIN STAGE 3: 3
BH CV STRESS DURATION SEC STAGE 1: 0
BH CV STRESS DURATION SEC STAGE 2: 0
BH CV STRESS DURATION SEC STAGE 3: 0
BH CV STRESS GRADE STAGE 1: 10
BH CV STRESS GRADE STAGE 2: 12
BH CV STRESS GRADE STAGE 3: 14
BH CV STRESS HR STAGE 1: 126
BH CV STRESS HR STAGE 2: 156
BH CV STRESS HR STAGE 3: 171
BH CV STRESS METS STAGE 1: 5
BH CV STRESS METS STAGE 2: 7.5
BH CV STRESS METS STAGE 3: 10
BH CV STRESS PROTOCOL 1: NORMAL
BH CV STRESS RECOVERY BP: NORMAL MMHG
BH CV STRESS RECOVERY HR: 99 BPM
BH CV STRESS SPEED STAGE 1: 1.7
BH CV STRESS SPEED STAGE 2: 2.5
BH CV STRESS SPEED STAGE 3: 3.4
BH CV STRESS STAGE 1: 1
BH CV STRESS STAGE 2: 2
BH CV STRESS STAGE 3: 3
MAXIMAL PREDICTED HEART RATE: 174 BPM
PERCENT MAX PREDICTED HR: 98.28 %
STRESS BASELINE BP: NORMAL MMHG
STRESS BASELINE HR: 92 BPM
STRESS PERCENT HR: 116 %
STRESS POST ESTIMATED WORKLOAD: 10 METS
STRESS POST EXERCISE DUR MIN: 9 MIN
STRESS POST EXERCISE DUR SEC: 0 SEC
STRESS POST PEAK BP: NORMAL MMHG
STRESS POST PEAK HR: 171 BPM
STRESS TARGET HR: 148 BPM

## 2022-01-17 PROCEDURE — 93017 CV STRESS TEST TRACING ONLY: CPT

## 2022-01-17 PROCEDURE — 93000 ELECTROCARDIOGRAM COMPLETE: CPT | Performed by: INTERNAL MEDICINE

## 2022-01-17 PROCEDURE — 99204 OFFICE O/P NEW MOD 45 MIN: CPT | Performed by: INTERNAL MEDICINE

## 2022-01-17 PROCEDURE — 93018 CV STRESS TEST I&R ONLY: CPT | Performed by: INTERNAL MEDICINE

## 2022-01-17 PROCEDURE — 93016 CV STRESS TEST SUPVJ ONLY: CPT | Performed by: INTERNAL MEDICINE

## 2022-01-17 NOTE — PROGRESS NOTES
Date of Office Visit: 22  Encounter Provider: Zeferino Sharp MD  Place of Service: [facility] CARDIOLOGY  Patient Name: Esa Lozano  :1975    Chief Complaint   Patient presents with   • Chest Pain   :     HPI:     Mr. Lozano is 46 y.o. and presents today in consultation for chest pain at the request of Dr Mcgrath.    He has hyperlipidemia but no history of HTN or diabetes. He is a former cigarette smoker. He had a normal stress test in .    In December, he presented to the ED after several days of chest pain. He felt gaseous/bloated, and had pinpoint pain on both sides of the chest. He describes the pain as a muscular soreness. It's below the clavicles bilaterally. It's not exertional. It's slightly pleuritic. It's not associated with nausea, palpitations, or diaphoresis. He notices it more if he's around noxious smells. When he's working or doing something physical, he has no chest pain or dyspnea. He denies leg swelling, orthopnea, palpitations, or syncope.       Past Medical History:   Diagnosis Date   • SAKINA (generalized anxiety disorder)    • GERD (gastroesophageal reflux disease)    • Hyperlipidemia    • Migraine    • Osteoarthritis    • Seasonal allergies        Past Surgical History:   Procedure Laterality Date   • HAND SURGERY  2011    1st digit left hand, accidently cut with a chain saw, 26 stitches. LOCAL ONLY   • KNEE ARTHROSCOPY Right 2018    Procedure: RIGHT KNEE ARTHROSCOPY, PARTIAL MEDIAL MENISECTOMY, EXTENSIVE CHONDROPLASTY AND MICRO FRACTURE, REMOVAL OF 8 LOOSE BODIES ;  Surgeon: Ihsan Fontanez MD;  [place] [facility] [place] [facility] [place]  Service:    • KNEE SURGERY Right 2018    arthroscopy   • TRANSESOPHAGEAL ECHOCARDIOGRAM (JERI)  2015    [facility]       Social History     Socioeconomic History   • Marital status:    • Number of children: 2   Tobacco Use   • Smoking status: Former Smoker     Packs/day: 1.00     Years: 18.00  "    Pack years: 18.00     Types: Cigarettes     Quit date: 2004     Years since quittin.0   • Smokeless tobacco: Never Used   Vaping Use   • Vaping Use: Never used   Substance and Sexual Activity   • Alcohol use: Not Currently     Comment: RARE   • Drug use: No   • Sexual activity: Defer       Family History   Problem Relation Age of Onset   • Alcohol abuse Other    • Arthritis Other    • Cancer Other    • Diabetes Other    • Migraines Other    • Diabetes Mother    • Migraines Mother        Review of Systems   Cardiovascular: Positive for chest pain.   Musculoskeletal: Positive for arthritis.   Psychiatric/Behavioral: The patient is nervous/anxious.    All other systems reviewed and are negative.      Allergies   Allergen Reactions   • Gluten Meal GI Intolerance         Current Outpatient Medications:   •  atorvastatin (LIPITOR) 40 MG tablet, Take 1 tablet by mouth Daily., Disp: 90 tablet, Rfl: 3  •  lansoprazole (PREVACID) 30 MG capsule, Take 1 capsule by mouth Daily. For GERD, Disp: 90 capsule, Rfl: 3  •  rizatriptan (MAXALT) 10 MG tablet, Take 1 tablet by mouth 1 (One) Time As Needed for Migraine for up to 1 dose. May repeat in 2 hours if needed, Disp: 6 tablet, Rfl: 0  •  vitamin C (ASCORBIC ACID) 250 MG tablet, Take 250 mg by mouth Daily., Disp: , Rfl:   •  Vitamin D, Cholecalciferol, (CHOLECALCIFEROL) 10 MCG (400 UNIT) tablet, Take 400 Units by mouth Daily., Disp: , Rfl:       Objective:     Vitals:    22 0856   BP: 140/82   BP Location: Left arm   Pulse: 80   Weight: 96.3 kg (212 lb 3.2 oz)   Height: 177.8 cm (70\")     Body mass index is 30.45 kg/m².    Vitals reviewed.   Constitutional:       Appearance: Healthy appearance. Well-developed and not in distress.   Eyes:      Conjunctiva/sclera: Conjunctivae normal.      Pupils: Pupils are equal, round, and reactive to light.   HENT:      Head: Normocephalic.      Nose: Nose normal.         Comments: masked  Neck:      Vascular: No JVD. JVD " normal.      Lymphadenopathy: No cervical adenopathy.   Pulmonary:      Effort: Pulmonary effort is normal.      Breath sounds: Normal breath sounds.   Cardiovascular:      Normal rate. Regular rhythm.      Murmurs: There is no murmur.   Pulses:     Intact distal pulses.   Edema:     Peripheral edema absent.   Abdominal:      Palpations: Abdomen is soft.      Tenderness: There is no abdominal tenderness.   Musculoskeletal: Normal range of motion.      Cervical back: Normal range of motion. Skin:     General: Skin is warm and dry.      Findings: No rash.   Neurological:      General: No focal deficit present.      Mental Status: Alert, oriented to person, place, and time and oriented to person, place and time.      Cranial Nerves: No cranial nerve deficit.   Psychiatric:         Behavior: Behavior normal.         Thought Content: Thought content normal.         Judgment: Judgment normal.           ECG 12 Lead    Date/Time: 1/17/2022 9:05 AM  Performed by: Zeferino Sharp MD  Authorized by: Zeferino Sharp MD   Comparison: compared with previous ECG   Similar to previous ECG  Rhythm: sinus rhythm  Conduction: conduction normal  ST Segments: ST segments normal  T Waves: T waves normal  QRS axis: normal  Other: no other findings    Clinical impression: normal ECG           Assessment:       Diagnosis Plan   1. Precordial pain  ECG 12 Lead    Treadmill Stress Test   2. Hyperlipidemia, mixed        Plan:       Mr Lozano has hyperlipidemia and is a former smoker. He is describing extremely atypical chest pain.  His exam and EKG are normal.  A treadmill stress was completed today, and he exercised for 9 minutes without chest pain or dyspnea.  The stress itself was normal.  His pain sounds musculoskeletal, and I have no further cardiac recommendations.    His atorvastatin was recently increased from 20 mg to 40 mg, and I agree with that plan, as his lipid panel was not optimal prior to the change.    Sincerely,       Zeferino BURTON  MD [place]

## 2022-01-21 ENCOUNTER — PATIENT ROUNDING (BHMG ONLY) (OUTPATIENT)
Dept: CARDIOLOGY | Facility: CLINIC | Age: 47
End: 2022-01-21

## 2022-02-22 ENCOUNTER — OFFICE VISIT (OUTPATIENT)
Dept: INTERNAL MEDICINE | Facility: CLINIC | Age: 47
End: 2022-02-22

## 2022-02-22 VITALS
BODY MASS INDEX: 30.24 KG/M2 | OXYGEN SATURATION: 98 % | DIASTOLIC BLOOD PRESSURE: 71 MMHG | SYSTOLIC BLOOD PRESSURE: 132 MMHG | HEIGHT: 70 IN | HEART RATE: 87 BPM | WEIGHT: 211.2 LBS | TEMPERATURE: 98.2 F

## 2022-02-22 DIAGNOSIS — Z00.00 ENCOUNTER FOR HEALTH MAINTENANCE EXAMINATION IN ADULT: Primary | ICD-10-CM

## 2022-02-22 DIAGNOSIS — Z13.29 SCREENING FOR THYROID DISORDER: ICD-10-CM

## 2022-02-22 DIAGNOSIS — E78.2 HYPERLIPIDEMIA, MIXED: ICD-10-CM

## 2022-02-22 PROCEDURE — 99396 PREV VISIT EST AGE 40-64: CPT | Performed by: FAMILY MEDICINE

## 2022-02-22 NOTE — PROGRESS NOTES
"Chief Complaint  Annual Exam    Subjective            Esa Lozano presents to [facility]  History of Present Illness    CPE- Patient reporting that health is doing well overall.  Patient is here today for annual physical.  Eating a balanced diet.    Labs: Due for routine labs    Colorectal cancer screening: UTD    Review of Systems      Objective   Vital Signs:   /71 (BP Location: Left arm, Patient Position: Sitting, Cuff Size: Adult)   Pulse 87   Temp 98.2 °F (36.8 °C) (Temporal)   Ht 177.8 cm (70\")   Wt 95.8 kg (211 lb 3.2 oz)   SpO2 98%   BMI 30.30 kg/m²     Physical Exam  Vitals and nursing note reviewed.   Constitutional:       General: He is not in acute distress.     Appearance: Normal appearance.   HENT:      Right Ear: Tympanic membrane, ear canal and external ear normal.      Left Ear: Tympanic membrane, ear canal and external ear normal.      Nose: Nose normal.      Mouth/Throat:      Mouth: Mucous membranes are moist.   Eyes:      General:         Right eye: No discharge.         Left eye: No discharge.      Conjunctiva/sclera: Conjunctivae normal.   Cardiovascular:      Rate and Rhythm: Normal rate and regular rhythm.      Pulses: Normal pulses.      Heart sounds: Normal heart sounds.   Pulmonary:      Effort: Pulmonary effort is normal.      Breath sounds: Normal breath sounds.   Abdominal:      General: Bowel sounds are normal. There is no distension.      Palpations: Abdomen is soft.      Tenderness: There is no abdominal tenderness.   Musculoskeletal:      Cervical back: Neck supple.      Right lower leg: No edema.      Left lower leg: No edema.   Lymphadenopathy:      Cervical: No cervical adenopathy.   Skin:     General: Skin is warm.      Findings: No rash.   Neurological:      General: No focal deficit present.      Mental Status: He is alert. Mental status is at baseline.   Psychiatric:         Mood and Affect: Mood normal.         Behavior: " Behavior normal.        Result Review :   The following data was reviewed by: Lucio Mcgrath MD on 02/22/2022:  Common labs    Common Labsle 8/12/21 8/12/21 12/13/21 12/13/21    0933 0933 1345 1345   Glucose 103 (A)   104 (A)   BUN 12   15   Creatinine 1.04   0.92   eGFR Non  Am 77   89   eGFR African Am 94      Sodium 137   138   Potassium 4.6   4.7   Chloride 102   101   Calcium 9.8   9.9   Total Protein 6.9      Albumin 4.40   4.50   Total Bilirubin 0.6   0.6   Alkaline Phosphatase 61   58   AST (SGOT) 44 (A)   48 (A)   ALT (SGPT) 79 (A)   83 (A)   WBC   8.30    Hemoglobin   16.7    Hematocrit   47.1    Platelets   191    Total Cholesterol  205 (A)     Triglycerides  213 (A)     HDL Cholesterol  34 (A)     LDL Cholesterol   133 (A)     (A) Abnormal value       Comments are available for some flowsheets but are not being displayed.                     Assessment and Plan    Diagnoses and all orders for this visit:    1. Encounter for health maintenance examination in adult (Primary)  -     CBC (No Diff); Future  -     Comprehensive Metabolic Panel; Future  -     Lipid Panel With LDL / HDL Ratio; Future  -     TSH Rfx On Abnormal To Free T4; Future    2. Hyperlipidemia, mixed  -     CBC (No Diff); Future  -     Comprehensive Metabolic Panel; Future  -     Lipid Panel With LDL / HDL Ratio; Future    3. Screening for thyroid disorder  -     TSH Rfx On Abnormal To Free T4; Future          The patient was counseled regarding nutrition, physical activity, healthy weight, injury prevention, misuse of tobacco, alcohol and illicit drugs, mental health, immunizations, and screenings.      Follow Up   Return in about 1 year (around 2/23/2023) for Annual physical.  Patient was given instructions and counseling regarding his condition or for health maintenance advice. Please see specific information pulled into the AVS if appropriate.

## 2022-03-23 ENCOUNTER — OFFICE (AMBULATORY)
Dept: URBAN - METROPOLITAN AREA CLINIC 75 | Facility: CLINIC | Age: 47
End: 2022-03-23

## 2022-03-23 VITALS
SYSTOLIC BLOOD PRESSURE: 124 MMHG | OXYGEN SATURATION: 99 % | TEMPERATURE: 96.4 F | DIASTOLIC BLOOD PRESSURE: 80 MMHG | HEART RATE: 68 BPM | HEIGHT: 70 IN | WEIGHT: 200 LBS

## 2022-03-23 DIAGNOSIS — K21.9 GASTRO-ESOPHAGEAL REFLUX DISEASE WITHOUT ESOPHAGITIS: ICD-10-CM

## 2022-03-23 DIAGNOSIS — R19.5 OTHER FECAL ABNORMALITIES: ICD-10-CM

## 2022-03-23 DIAGNOSIS — R93.3 ABNORMAL FINDINGS ON DIAGNOSTIC IMAGING OF OTHER PARTS OF DI: ICD-10-CM

## 2022-03-23 DIAGNOSIS — K29.70 GASTRITIS, UNSPECIFIED, WITHOUT BLEEDING: ICD-10-CM

## 2022-03-23 DIAGNOSIS — R10.30 LOWER ABDOMINAL PAIN, UNSPECIFIED: ICD-10-CM

## 2022-03-23 DIAGNOSIS — K31.9 DISEASE OF STOMACH AND DUODENUM, UNSPECIFIED: ICD-10-CM

## 2022-03-23 DIAGNOSIS — K90.0 CELIAC DISEASE: ICD-10-CM

## 2022-03-23 DIAGNOSIS — Z86.010 PERSONAL HISTORY OF COLONIC POLYPS: ICD-10-CM

## 2022-03-23 PROBLEM — K63.5 POLYP OF COLON: Status: ACTIVE | Noted: 2020-07-28

## 2022-03-23 PROCEDURE — 99213 OFFICE O/P EST LOW 20 MIN: CPT | Performed by: NURSE PRACTITIONER

## 2022-03-23 RX ORDER — LANSOPRAZOLE 30 MG/1
30 CAPSULE, DELAYED RELEASE PELLETS ORAL
Qty: 90 | Refills: 4 | Status: COMPLETED
End: 2024-06-13

## 2022-04-22 ENCOUNTER — TELEPHONE (OUTPATIENT)
Dept: ORTHOPEDIC SURGERY | Facility: CLINIC | Age: 47
End: 2022-04-22

## 2022-05-02 ENCOUNTER — OFFICE VISIT (OUTPATIENT)
Dept: INTERNAL MEDICINE | Facility: CLINIC | Age: 47
End: 2022-05-02

## 2022-05-02 ENCOUNTER — HOSPITAL ENCOUNTER (OUTPATIENT)
Dept: GENERAL RADIOLOGY | Facility: HOSPITAL | Age: 47
Discharge: HOME | End: 2022-05-02
Admitting: NURSE PRACTITIONER

## 2022-05-02 VITALS
DIASTOLIC BLOOD PRESSURE: 84 MMHG | BODY MASS INDEX: 30.18 KG/M2 | WEIGHT: 210.8 LBS | HEIGHT: 70 IN | TEMPERATURE: 97.5 F | HEART RATE: 76 BPM | OXYGEN SATURATION: 97 % | SYSTOLIC BLOOD PRESSURE: 133 MMHG

## 2022-05-02 DIAGNOSIS — S69.91XA INJURY OF RIGHT HAND, INITIAL ENCOUNTER: Primary | ICD-10-CM

## 2022-05-02 DIAGNOSIS — S69.91XA INJURY OF RIGHT HAND, INITIAL ENCOUNTER: ICD-10-CM

## 2022-05-02 PROCEDURE — 99213 OFFICE O/P EST LOW 20 MIN: CPT | Performed by: NURSE PRACTITIONER

## 2022-05-02 PROCEDURE — 73130 X-RAY EXAM OF HAND: CPT

## 2022-05-02 NOTE — PROGRESS NOTES
"        Chief Complaint  Hand Injury (Two Fingers on right hand happened 1 month ago )     Subjective:      History of Present Illness {CC  Problem List  Visit  Diagnosis   Encounters  Notes  Medications  Labs  Result Review Imaging  Media :23}     Esa Lozano is a patient of Lucio Mcgrath MD and presents to [facility] for hand pain.     He was fishing - went to zip down shield and right hand went down and hit object.    He states his fingers (3rd and 4th) were stuck flexed for a while but he was able to get them open.   Mild swelling.  No bruising.   Swelling almost resolved -  with ROM at times.       Answers for HPI/ROS submitted by the patient on 4/27/2022  What is the primary reason for your visit?: Other  Please describe your symptoms.: Injured hand  Have you had these symptoms before?: No  How long have you been having these symptoms?: 1-2 weeks  Please list any medications you are currently taking for this condition.: Ibuprofen prn        I have reviewed patient's medical history, any new submitted information provided by patient or medical assistant and updated medical record.      Objective:      Physical Exam  Musculoskeletal:      Right hand: Tenderness present. No swelling, deformity or bony tenderness. Normal range of motion.      is equal to left.    Sensation intact.     Result Review  Data Reviewed:{ Labs  Result Review  Imaging  Med Tab  Media :23}                Vital Signs:   /84 (BP Location: Left arm, Patient Position: Sitting, Cuff Size: Adult)   Pulse 76   Temp 97.5 °F (36.4 °C) (Temporal)   Ht 177.8 cm (70\")   Wt 95.6 kg (210 lb 12.8 oz)   SpO2 97%   BMI 30.25 kg/m²         Requested Prescriptions      No prescriptions requested or ordered in this encounter       Routine medications provided by this office will also be refilled via pharmacy request.       Current Outpatient Medications:   •  atorvastatin " (LIPITOR) 40 MG tablet, Take 1 tablet by mouth Daily., Disp: 90 tablet, Rfl: 3  •  lansoprazole (PREVACID) 30 MG capsule, Take 1 capsule by mouth Daily. For GERD, Disp: 90 capsule, Rfl: 3  •  rizatriptan (MAXALT) 10 MG tablet, Take 1 tablet by mouth 1 (One) Time As Needed for Migraine for up to 1 dose. May repeat in 2 hours if needed, Disp: 6 tablet, Rfl: 0  •  vitamin C (ASCORBIC ACID) 250 MG tablet, Take 250 mg by mouth Daily., Disp: , Rfl:   •  Vitamin D, Cholecalciferol, (CHOLECALCIFEROL) 10 MCG (400 UNIT) tablet, Take 400 Units by mouth Daily., Disp: , Rfl:      Assessment and Plan:      Assessment and Plan {CC Problem List  Visit Diagnosis  ROS  Review (Popup)  Health Maintenance  Quality  BestPractice  Medications  SmartSets  SnapShot Encounters  Media :23}     Problem List Items Addressed This Visit    None     Visit Diagnoses     Injury of right hand, initial encounter    -  Primary    Relevant Orders    XR Hand 3+ View Right        Likely mild sprain - will notify him of x-ray results.   ROM exercises.   IBU alt with APAP as needed for pain/ inflammation.     Follow Up {Instructions Charge Capture  Follow-up Communications :23}     Return if symptoms worsen or fail to improve.    Follow up with PCP as scheduled.     Patient was given instructions and counseling regarding his condition or for health maintenance advice. Please see specific information pulled into the AVS if appropriate.    Dragon disclaimer:   Much of this encounter note is an electronic transcription/translation of spoken language to printed text. The electronic translation of spoken language may permit erroneous, or at times, nonsensical words or phrases to be inadvertently transcribed; Although I have reviewed the note for such errors, some may still exist.     Additional Patient Counseling:       There are no Patient Instructions on file for this visit.

## 2022-07-30 DIAGNOSIS — E78.2 HYPERLIPIDEMIA, MIXED: Chronic | ICD-10-CM

## 2022-08-01 RX ORDER — ATORVASTATIN CALCIUM 40 MG/1
TABLET, FILM COATED ORAL
Qty: 76 TABLET | Refills: 0 | Status: SHIPPED | OUTPATIENT
Start: 2022-08-01 | End: 2022-12-12

## 2022-09-13 ENCOUNTER — CLINICAL SUPPORT (OUTPATIENT)
Dept: ORTHOPEDIC SURGERY | Facility: CLINIC | Age: 47
End: 2022-09-13

## 2022-09-13 ENCOUNTER — TELEPHONE (OUTPATIENT)
Dept: ORTHOPEDIC SURGERY | Facility: CLINIC | Age: 47
End: 2022-09-13

## 2022-09-13 VITALS — BODY MASS INDEX: 29.82 KG/M2 | HEIGHT: 70 IN | WEIGHT: 208.3 LBS | TEMPERATURE: 97.4 F

## 2022-09-13 DIAGNOSIS — R52 PAIN: Primary | ICD-10-CM

## 2022-09-13 DIAGNOSIS — M17.0 PRIMARY OSTEOARTHRITIS OF BOTH KNEES: ICD-10-CM

## 2022-09-13 PROCEDURE — 73562 X-RAY EXAM OF KNEE 3: CPT | Performed by: ORTHOPAEDIC SURGERY

## 2022-09-13 PROCEDURE — 20610 DRAIN/INJ JOINT/BURSA W/O US: CPT | Performed by: ORTHOPAEDIC SURGERY

## 2022-09-13 RX ORDER — METHYLPREDNISOLONE ACETATE 80 MG/ML
80 INJECTION, SUSPENSION INTRA-ARTICULAR; INTRALESIONAL; INTRAMUSCULAR; SOFT TISSUE
Status: COMPLETED | OUTPATIENT
Start: 2022-09-13 | End: 2022-09-13

## 2022-09-13 RX ADMIN — METHYLPREDNISOLONE ACETATE 80 MG: 80 INJECTION, SUSPENSION INTRA-ARTICULAR; INTRALESIONAL; INTRAMUSCULAR; SOFT TISSUE at 17:42

## 2022-09-13 NOTE — TELEPHONE ENCOUNTER
Either Dr. Lucio Medina with [facility] [place] [facility] or Dr. Joss Eckert [facility]

## 2022-09-13 NOTE — PROGRESS NOTES
9/13/2022    Esa Lozano is here today for worsening knee pain. Pt has undergone injection of the knee in the past with good resolution of symptoms. Pt is requesting a repeat injection.     KNEE Injection Procedure Note:    Large Joint Arthrocentesis: R knee  Date/Time: 9/13/2022 5:42 PM  Consent given by: patient  Site marked: site marked  Timeout: Immediately prior to procedure a time out was called to verify the correct patient, procedure, equipment, support staff and site/side marked as required   Supporting Documentation  Indications: pain and joint swelling   Procedure Details  Location: knee - R knee  Preparation: Patient was prepped and draped in the usual sterile fashion  Needle size: 22 G  Approach: anterolateral  Medications administered: 80 mg methylPREDNISolone acetate 80 MG/ML; 2 mL lidocaine (cardiac)  Patient tolerance: patient tolerated the procedure well with no immediate complications    Large Joint Arthrocentesis: L knee  Date/Time: 9/13/2022 5:42 PM  Consent given by: patient  Site marked: site marked  Timeout: Immediately prior to procedure a time out was called to verify the correct patient, procedure, equipment, support staff and site/side marked as required   Supporting Documentation  Indications: pain and joint swelling   Procedure Details  Location: knee - L knee  Preparation: Patient was prepped and draped in the usual sterile fashion  Needle size: 22 G  Approach: anterolateral  Medications administered: 80 mg methylPREDNISolone acetate 80 MG/ML; 2 mL lidocaine (cardiac)  Patient tolerance: patient tolerated the procedure well with no immediate complications        X-rays 3 views of both knees AP lateral and sunrise were ordered and reviewed for evaluation of knee pain.  He has moderate tricompartmental osteoarthritic changes but good joint space preservation.  In comparison to previous films they are unchanged.      At the conclusion of the injection I discussed the importance of  continued quad strengthening exercises on a daily basis. I will see the patient back if the symptoms should fail to improve or worsen.    Jose Levin MD  9/13/2022

## 2022-09-13 NOTE — TELEPHONE ENCOUNTER
Caller: ISHA  Relationship to Patient: SELF     Phone Number: [phone]   Reason for Call: PATIENT STATES HE WAS GOING TO GET NAME OF HAND SPECIALIST FROM DR LAWRENCE

## 2022-09-22 ENCOUNTER — TELEMEDICINE (OUTPATIENT)
Dept: INTERNAL MEDICINE | Facility: CLINIC | Age: 47
End: 2022-09-22

## 2022-09-22 DIAGNOSIS — U07.1 COVID: Primary | ICD-10-CM

## 2022-09-22 PROCEDURE — 99213 OFFICE O/P EST LOW 20 MIN: CPT

## 2022-09-22 NOTE — PATIENT INSTRUCTIONS
Isolate for another 2 days. Then may go in public with mask if fever free for 24 hours. Tylenol for fever. Mucinex for nasal secretions. Stay hydrated with water. Rest.    Seek emergency medical treatment for chest pain, difficulty breathing with a sense of panic or bluish discoloration around lips or fingers.

## 2022-09-26 NOTE — PROGRESS NOTES
"Chief Complaint  No chief complaint on file.    Subjective        Esa Lozano presents to [facility]  History of Present Illness  46-year-old male presenting for video visit after a positive COVID test.  Patient Dr. Mcgrath.  Tested positive for COVID 2 days ago.  Symptoms include \"everything\".  Has had low energy, cough, sore throat, \"salty\" taste, diarrhea and vomiting.  Has a migraine all night.  Today has been the best he is felt so far.      Objective   Vital Signs:  There were no vitals taken for this visit.  Estimated body mass index is 29.89 kg/m² as calculated from the following:    Height as of 9/13/22: 177.8 cm (70\").    Weight as of 9/13/22: 94.5 kg (208 lb 4.8 oz).          Physical Exam  Constitutional:       Appearance: Normal appearance.   HENT:      Head: Normocephalic.   Neurological:      General: No focal deficit present.      Mental Status: He is alert and oriented to person, place, and time.   Psychiatric:         Mood and Affect: Mood normal.         Behavior: Behavior normal.         Thought Content: Thought content normal.         Judgment: Judgment normal.        Result Review :    Common labs    Common Labs 12/13/21 12/13/21 5/2/22 5/2/22 5/2/22    1345 1345 0937 0937 0937   Glucose  104 (A)  106 (A)    BUN  15  13    Creatinine  0.92  0.86    eGFR Non African Am  89      Sodium  138  140    Potassium  4.7  4.6    Chloride  101  99    Calcium  9.9  10.0    Total Protein    7.2    Albumin  4.50  4.7    Total Bilirubin  0.6  0.6    Alkaline Phosphatase  58  65    AST (SGOT)  48 (A)  38    ALT (SGPT)  83 (A)  53 (A)    WBC 8.30  5.6     Hemoglobin 16.7  16.1     Hematocrit 47.1  49.9     Platelets 191  170     Total Cholesterol     138   Triglycerides     126   HDL Cholesterol     42   LDL Cholesterol      73   (A) Abnormal value            Current Outpatient Medications on File Prior to Visit   Medication Sig Dispense Refill   • atorvastatin (LIPITOR) 40 MG " tablet Take 1 tablet by mouth once daily 76 tablet 0   • lansoprazole (PREVACID) 30 MG capsule Take 1 capsule by mouth Daily. For GERD 90 capsule 3   • rizatriptan (MAXALT) 10 MG tablet Take 1 tablet by mouth 1 (One) Time As Needed for Migraine for up to 1 dose. May repeat in 2 hours if needed 6 tablet 0   • vitamin C (ASCORBIC ACID) 250 MG tablet Take 250 mg by mouth Daily.     • Vitamin D, Cholecalciferol, (CHOLECALCIFEROL) 10 MCG (400 UNIT) tablet Take 400 Units by mouth Daily.       No current facility-administered medications on file prior to visit.                 Assessment and Plan   Diagnoses and all orders for this visit:    1. COVID (Primary)      Isolate for another 2 days. Then may go in public with mask if fever free for 24 hours. Tylenol for fever. Mucinex for nasal secretions. Stay hydrated with water. Rest.    Seek emergency medical treatment for chest pain, difficulty breathing with a sense of panic or bluish discoloration around lips or fingers.       Follow Up   Return if symptoms worsen or fail to improve.  Patient was given instructions and counseling regarding his condition or for health maintenance advice. Please see specific information pulled into the AVS if appropriate.     You have chosen to receive care through a telehealth visit.  Do you consent to use a video/audio connection for your medical care today? Yes    During visit, patient was at his residence and provider was at medical practice office.

## 2022-10-20 ENCOUNTER — TELEPHONE (OUTPATIENT)
Dept: INTERNAL MEDICINE | Facility: CLINIC | Age: 47
End: 2022-10-20

## 2022-10-20 NOTE — TELEPHONE ENCOUNTER
Caller: Amanda Lozano    Relationship: Emergency Contact    Best call back number: [phone]    What medication are you requesting:     What are your current symptoms: RED EYES, YELLOW/GREEN DISCHARGE     How long have you been experiencing symptoms:     Have you had these symptoms before:    [] Yes  [] No    Have you been treated for these symptoms before:   [] Yes  [] No    If a prescription is needed, what is your preferred pharmacy and phone number: [facility] [address] [place], [place] [address] [facility] [phone] [facility] [phone] FX     Additional notes:  PLEASE ADVISE IF MEDICATION CAN OR CAN'T BE PRESCRIBED

## 2022-10-20 NOTE — TELEPHONE ENCOUNTER
Pt wife states they are 3 hours way I offered a mychart and she states she doesn't have wifi and she not sure if Internet will be strong enough to hold the call

## 2022-12-07 ENCOUNTER — TELEMEDICINE (OUTPATIENT)
Dept: INTERNAL MEDICINE | Facility: CLINIC | Age: 47
End: 2022-12-07

## 2022-12-07 DIAGNOSIS — J06.9 VIRAL UPPER RESPIRATORY TRACT INFECTION: ICD-10-CM

## 2022-12-07 DIAGNOSIS — R05.1 ACUTE COUGH: Primary | ICD-10-CM

## 2022-12-07 PROCEDURE — 99213 OFFICE O/P EST LOW 20 MIN: CPT | Performed by: NURSE PRACTITIONER

## 2022-12-07 RX ORDER — DEXTROMETHORPHAN HYDROBROMIDE AND PROMETHAZINE HYDROCHLORIDE 15; 6.25 MG/5ML; MG/5ML
5 SYRUP ORAL 4 TIMES DAILY PRN
Qty: 180 ML | Refills: 0 | Status: SHIPPED | OUTPATIENT
Start: 2022-12-07

## 2022-12-07 NOTE — PROGRESS NOTES
Name: Esa Lozano  :  1975  Provider: Lucio Mcgrath MD     Subjective:      Chief Complaint   Patient presents with   • Cough   • URI        Esa Lozano is a 47 y.o. male and has scheduled an Video Visit.     Patient presents during the COVID-19 pandemic/federally declared [facility] of public health emergency.   This service was conducted via TeleType: Epic Video.     Covid/Flu like symptoms:     Onset:   2 days ago: child had URI, now wife has it.     Most common symptoms include:  [x] Fever  [x] Dry cough  [] Tiredness / fatigue      Less common symptoms:  [x] Body aches and pains: now resolved.   [] Sore throat  [] Diarrhea  [] Conjunctivitis  [] Headache  [] Loss of taste or smell  [] a rash on skin, or discoloration of fingers or toes    Serious symptoms: [x] Denies   [] Difficulty breathing or shortness of breath  [] Chest pain or pressure  [] Loss of speech of movement    Symptom onset:   [x] Gradual   [] Sudden     Symptom progression:   [] Improving  [] Worsening   [x] Unchanged     Known COVID exposure:  [] Yes  [] No  [x] Unknown     Vaccinated:   [] Yes  [x] No    He had COVID om September   Most symptoms resolved.   Lingering cough, NP and drainage.      OTC flu remedy helped but stopped.     Location:   Provider: in office  Patient: in Warehouse     I have reviewed the patient's medical history in detail and updated the computerized patient record.    Past Medical History:   Diagnosis Date   • SAKINA (generalized anxiety disorder)    • GERD (gastroesophageal reflux disease)    • Hyperlipidemia    • Migraine    • Osteoarthritis    • Seasonal allergies        Past Surgical History:   Procedure Laterality Date   • HAND SURGERY  2011    1st digit left hand, accidently cut with a chain saw, 26 stitches. LOCAL ONLY   • KNEE ARTHROSCOPY Right 2018    Procedure: RIGHT KNEE ARTHROSCOPY, PARTIAL MEDIAL MENISECTOMY, EXTENSIVE CHONDROPLASTY AND MICRO FRACTURE, REMOVAL OF 8  LOOSE BODIES ;  Surgeon: Ihsan Fontanez MD;  Location: [facility] OR [facility];  Service:    • KNEE SURGERY Right 2018    arthroscopy   • TRANSESOPHAGEAL ECHOCARDIOGRAM (JERI)  2015    [facility]       Family History   Problem Relation Age of Onset   • Alcohol abuse Other    • Arthritis Other    • Cancer Other    • Diabetes Other    • Migraines Other    • Diabetes Mother    • Migraines Mother        Social History     Socioeconomic History   • Marital status:    • Number of children: 2   Tobacco Use   • Smoking status: Former     Packs/day: 1.00     Years: 18.00     Pack years: 18.00     Types: Cigarettes     Quit date: 2004     Years since quittin.9   • Smokeless tobacco: Never   Vaping Use   • Vaping Use: Never used   Substance and Sexual Activity   • Alcohol use: Not Currently     Comment: RARE   • Drug use: No   • Sexual activity: Defer       Most Recent Immunizations   Administered Date(s) Administered   • Hep A / Hep B 2006         Review of Systems:   Review of Systems   Respiratory: Positive for cough. Negative for shortness of breath and wheezing.    Cardiovascular: Negative for chest pain.         Objective:      Physical Exam:   NO Physical exam due to video visit.  On Visual and Verbal exam:    Constitution: NAD   Pulmonary: unlabored   Psych: A&O, Calm       Vital Signs:  NO Office Vitals due to video visit.    Patient provided with home vitals which include:         Requested Prescriptions     Pending Prescriptions Disp Refills   • promethazine-dextromethorphan (PROMETHAZINE-DM) 6.25-15 MG/5ML syrup 180 mL 0     Sig: Take 5 mL by mouth 4 (Four) Times a Day As Needed for Cough.     Routine medications provided by this office will also be refilled via pharmacy request.       Current Outpatient Medications:   •  atorvastatin (LIPITOR) 40 MG tablet, Take 1 tablet by mouth once daily, Disp: 76 tablet, Rfl: 0  •  lansoprazole (PREVACID) 30 MG capsule, Take 1 capsule by  "mouth Daily. For GERD, Disp: 90 capsule, Rfl: 3  •  rizatriptan (MAXALT) 10 MG tablet, Take 1 tablet by mouth 1 (One) Time As Needed for Migraine for up to 1 dose. May repeat in 2 hours if needed, Disp: 6 tablet, Rfl: 0  •  vitamin C (ASCORBIC ACID) 250 MG tablet, Take 250 mg by mouth Daily., Disp: , Rfl:   •  Vitamin D, Cholecalciferol, (CHOLECALCIFEROL) 10 MCG (400 UNIT) tablet, Take 400 Units by mouth Daily., Disp: , Rfl:        Assessment and Plan:      Based upon patient provided history and account of medical problem, I suspect the patient has:     Problems Addressed this Visit    None  Visit Diagnoses     Acute cough    -  Primary    Viral upper respiratory tract infection          Diagnoses       Codes Comments    Acute cough    -  Primary ICD-10-CM: R05.1  ICD-9-CM: 786.2     Viral upper respiratory tract infection     ICD-10-CM: J06.9  ICD-9-CM: 465.9            See \"patient instructions\" for portions of the plan for treatment.     Discussed any change in Rx and discussed visit with patient.  All questions answered.      Return if symptoms worsen or fail to improve.    Arturo \"Keagan\" Stacie, APRN   12/07/22    Dragon disclaimer:   Much of this encounter note is an electronic transcription/translation of spoken language to printed text. The electronic translation of spoken language may permit erroneous, or at times, nonsensical words or phrases to be inadvertently transcribed; Although I have reviewed the note for such errors, some may still exist.     Additional Patient Counseling:       Patient Instructions   Honey and Lemon Tea for cough  • 1 Tbsp lemon juice   • 2 Tbsp honey   • 1/2 cup or more of hot water  Directions:   Put honey and lemon juice into a tea cup or mug. Add hot water and stir. Add more lemon juice, honey, or hot water to taste.  Sip on this and have two or three per day while cough is present.     Not for children less than 2 years of age.   Caution if diabetic.      Continue OTC cold/ " flu treatment as needed.     Drink plenty of fluids.

## 2022-12-07 NOTE — PATIENT INSTRUCTIONS
Honey and Lemon Tea for cough  1 Tbsp lemon juice   2 Tbsp honey   1/2 cup or more of hot water  Directions:   Put honey and lemon juice into a tea cup or mug. Add hot water and stir. Add more lemon juice, honey, or hot water to taste.  Sip on this and have two or three per day while cough is present.     Not for children less than 2 years of age.   Caution if diabetic.      Continue OTC cold/ flu treatment as needed.     Drink plenty of fluids.

## 2022-12-10 DIAGNOSIS — E78.2 HYPERLIPIDEMIA, MIXED: Chronic | ICD-10-CM

## 2022-12-12 RX ORDER — ATORVASTATIN CALCIUM 40 MG/1
TABLET, FILM COATED ORAL
Qty: 90 TABLET | Refills: 0 | Status: SHIPPED | OUTPATIENT
Start: 2022-12-12

## 2023-04-17 DIAGNOSIS — E78.2 HYPERLIPIDEMIA, MIXED: Chronic | ICD-10-CM

## 2023-04-17 RX ORDER — ATORVASTATIN CALCIUM 40 MG/1
TABLET, FILM COATED ORAL
Qty: 90 TABLET | Refills: 0 | Status: SHIPPED | OUTPATIENT
Start: 2023-04-17

## 2023-05-18 ENCOUNTER — CLINICAL SUPPORT (OUTPATIENT)
Dept: ORTHOPEDIC SURGERY | Facility: CLINIC | Age: 48
End: 2023-05-18
Payer: COMMERCIAL

## 2023-05-18 VITALS — BODY MASS INDEX: 31.95 KG/M2 | HEIGHT: 70 IN | TEMPERATURE: 97.8 F | WEIGHT: 223.2 LBS

## 2023-05-18 DIAGNOSIS — M17.0 PRIMARY OSTEOARTHRITIS OF BOTH KNEES: Primary | ICD-10-CM

## 2023-05-18 RX ORDER — SULFAMETHOXAZOLE AND TRIMETHOPRIM 800; 160 MG/1; MG/1
1 TABLET ORAL EVERY 12 HOURS SCHEDULED
COMMUNITY
Start: 2023-01-30

## 2023-05-18 RX ORDER — LANSOPRAZOLE 15 MG/1
CAPSULE, DELAYED RELEASE ORAL
COMMUNITY

## 2023-05-18 RX ORDER — METHYLPREDNISOLONE ACETATE 80 MG/ML
80 INJECTION, SUSPENSION INTRA-ARTICULAR; INTRALESIONAL; INTRAMUSCULAR; SOFT TISSUE
Status: COMPLETED | OUTPATIENT
Start: 2023-05-18 | End: 2023-05-18

## 2023-05-18 RX ORDER — ATORVASTATIN CALCIUM 40 MG/1
1 TABLET, FILM COATED ORAL DAILY
COMMUNITY
Start: 2023-01-18

## 2023-05-18 RX ORDER — RIZATRIPTAN BENZOATE 5 MG/1
TABLET ORAL
COMMUNITY

## 2023-05-18 RX ADMIN — METHYLPREDNISOLONE ACETATE 80 MG: 80 INJECTION, SUSPENSION INTRA-ARTICULAR; INTRALESIONAL; INTRAMUSCULAR; SOFT TISSUE at 16:47

## 2023-05-18 NOTE — PROGRESS NOTES
5/18/2023    Esa Lozano is here today for worsening knee pain. Pt has undergone injection of the knee in the past with good resolution of symptoms. Pt is requesting a repeat injection.     KNEE Injection Procedure Note:    Large Joint Arthrocentesis: R knee  Date/Time: 5/18/2023 4:47 PM  Consent given by: patient  Site marked: site marked  Timeout: Immediately prior to procedure a time out was called to verify the correct patient, procedure, equipment, support staff and site/side marked as required   Supporting Documentation  Indications: pain and joint swelling   Procedure Details  Location: knee - R knee  Preparation: Patient was prepped and draped in the usual sterile fashion  Needle size: 22 G  Approach: anterolateral  Medications administered: 80 mg methylPREDNISolone acetate 80 MG/ML; 2 mL lidocaine (cardiac)  Patient tolerance: patient tolerated the procedure well with no immediate complications    Large Joint Arthrocentesis: L knee  Date/Time: 5/18/2023 4:47 PM  Consent given by: patient  Site marked: site marked  Timeout: Immediately prior to procedure a time out was called to verify the correct patient, procedure, equipment, support staff and site/side marked as required   Supporting Documentation  Indications: pain and joint swelling   Procedure Details  Location: knee - L knee  Preparation: Patient was prepped and draped in the usual sterile fashion  Needle size: 22 G  Approach: anterolateral  Medications administered: 80 mg methylPREDNISolone acetate 80 MG/ML; 2 mL lidocaine (cardiac)  Patient tolerance: patient tolerated the procedure well with no immediate complications          At the conclusion of the injection I discussed the importance of continued quad strengthening exercises on a daily basis. I will see the patient back if the symptoms should fail to improve or worsen.    Jose Levin MD  5/18/2023

## 2023-08-07 ENCOUNTER — OFFICE VISIT (OUTPATIENT)
Dept: INTERNAL MEDICINE | Facility: CLINIC | Age: 48
End: 2023-08-07
Payer: COMMERCIAL

## 2023-08-07 ENCOUNTER — TELEPHONE (OUTPATIENT)
Dept: INTERNAL MEDICINE | Facility: CLINIC | Age: 48
End: 2023-08-07

## 2023-08-07 VITALS
SYSTOLIC BLOOD PRESSURE: 122 MMHG | HEIGHT: 70 IN | HEART RATE: 71 BPM | BODY MASS INDEX: 31.21 KG/M2 | RESPIRATION RATE: 18 BRPM | OXYGEN SATURATION: 98 % | WEIGHT: 218 LBS | DIASTOLIC BLOOD PRESSURE: 88 MMHG

## 2023-08-07 DIAGNOSIS — G43.009 MIGRAINE WITHOUT AURA AND WITHOUT STATUS MIGRAINOSUS, NOT INTRACTABLE: Chronic | ICD-10-CM

## 2023-08-07 DIAGNOSIS — R14.0 ABDOMINAL BLOATING: ICD-10-CM

## 2023-08-07 DIAGNOSIS — R53.83 LOW ENERGY: Primary | ICD-10-CM

## 2023-08-07 DIAGNOSIS — K21.9 GASTROESOPHAGEAL REFLUX DISEASE WITHOUT ESOPHAGITIS: Chronic | ICD-10-CM

## 2023-08-07 DIAGNOSIS — E78.2 HYPERLIPIDEMIA, MIXED: Primary | Chronic | ICD-10-CM

## 2023-08-07 DIAGNOSIS — R53.83 LOW ENERGY: ICD-10-CM

## 2023-08-07 LAB
ALBUMIN SERPL-MCNC: 4.4 G/DL (ref 3.5–5.2)
ALBUMIN/GLOB SERPL: 2 G/DL
ALP SERPL-CCNC: 59 U/L (ref 39–117)
ALT SERPL-CCNC: 130 U/L (ref 1–41)
AST SERPL-CCNC: 72 U/L (ref 1–40)
BILIRUB SERPL-MCNC: 0.7 MG/DL (ref 0–1.2)
BUN SERPL-MCNC: 15 MG/DL (ref 6–20)
BUN/CREAT SERPL: 15.5 (ref 7–25)
CALCIUM SERPL-MCNC: 10 MG/DL (ref 8.6–10.5)
CHLORIDE SERPL-SCNC: 105 MMOL/L (ref 98–107)
CHOLEST SERPL-MCNC: 133 MG/DL (ref 0–200)
CO2 SERPL-SCNC: 27 MMOL/L (ref 22–29)
CREAT SERPL-MCNC: 0.97 MG/DL (ref 0.76–1.27)
EGFRCR SERPLBLD CKD-EPI 2021: 96.9 ML/MIN/1.73
ERYTHROCYTE [DISTWIDTH] IN BLOOD BY AUTOMATED COUNT: 12.5 % (ref 12.3–15.4)
GLOBULIN SER CALC-MCNC: 2.2 GM/DL
GLUCOSE SERPL-MCNC: 110 MG/DL (ref 65–99)
HCT VFR BLD AUTO: 45.2 % (ref 37.5–51)
HDLC SERPL-MCNC: 36 MG/DL (ref 40–60)
HGB BLD-MCNC: 15.7 G/DL (ref 13–17.7)
LDLC SERPL CALC-MCNC: 76 MG/DL (ref 0–100)
LDLC/HDLC SERPL: 2.04 {RATIO}
MCH RBC QN AUTO: 31.6 PG (ref 26.6–33)
MCHC RBC AUTO-ENTMCNC: 34.7 G/DL (ref 31.5–35.7)
MCV RBC AUTO: 90.9 FL (ref 79–97)
PLATELET # BLD AUTO: 169 10*3/MM3 (ref 140–450)
POTASSIUM SERPL-SCNC: 4.7 MMOL/L (ref 3.5–5.2)
PROT SERPL-MCNC: 6.6 G/DL (ref 6–8.5)
RBC # BLD AUTO: 4.97 10*6/MM3 (ref 4.14–5.8)
SODIUM SERPL-SCNC: 142 MMOL/L (ref 136–145)
TRIGL SERPL-MCNC: 117 MG/DL (ref 0–150)
VLDLC SERPL CALC-MCNC: 21 MG/DL (ref 5–40)
WBC # BLD AUTO: 5.06 10*3/MM3 (ref 3.4–10.8)

## 2023-08-07 PROCEDURE — 99214 OFFICE O/P EST MOD 30 MIN: CPT | Performed by: FAMILY MEDICINE

## 2023-08-07 RX ORDER — MECOBALAMIN 5000 MCG
TABLET,DISINTEGRATING ORAL
Status: CANCELLED | OUTPATIENT
Start: 2023-08-07

## 2023-08-07 RX ORDER — ATORVASTATIN CALCIUM 40 MG/1
40 TABLET, FILM COATED ORAL DAILY
Qty: 90 TABLET | Refills: 4 | Status: SHIPPED | OUTPATIENT
Start: 2023-08-07

## 2023-08-07 RX ORDER — RIZATRIPTAN BENZOATE 10 MG/1
10 TABLET ORAL ONCE AS NEEDED
Qty: 6 TABLET | Refills: 11 | Status: SHIPPED | OUTPATIENT
Start: 2023-08-07

## 2023-08-07 RX ORDER — LANSOPRAZOLE 30 MG/1
30 CAPSULE, DELAYED RELEASE ORAL DAILY
Qty: 90 CAPSULE | Refills: 4 | Status: SHIPPED | OUTPATIENT
Start: 2023-08-07

## 2023-08-07 NOTE — PATIENT INSTRUCTIONS
"MyChart Tips:    MyChart is a useful part of our patient care program and is a great way for us to communicate lab results and for you to request refills.  Not all medical questions are appropriate for MyChart such as new medical issues that require taking a history, performing an exam, getting labs/studies or researching medical questions needed to be addressed in the office.    Examples of medical issues that are APPROPRIATE for MyChart:  -Follow-up on problems we have already addressed in a visit such as home testing results, blood pressure readings, glucose readings  -Questions that can be answered with a simple \"yes\" or \"no\"    Communication that is NOT APPROPRIATE for MyChart:  -MyChart is not for new problems, serious problems or urgent problems.  Urgent matters should be addressed by phone, in the office, urgent care or the ER.  -[employer] messages are not email.  Staff will check messages each weekday.  We strive for a 48-hour turnaround on messaging.    -[employer] (T/A[employer]t is not for private issues.  Messages are received first by our office staff.    Please allow up to 7 business days for lab results to be sent through [employer] to you before contacting your provider.  Sometimes we are waiting for results to get back from the lab and also your provider needs time to analyze them thoroughly before making recommendations.  While the internet has great resources, it is not a substitute for interpreting lab results.    We also ask that you not send [employer] messages and telephone calls regarding the same issue simultaneously.  This slows down the process of returning your call/message and confuses staff.    Be mindful that [email or web address] messages and telephone calls become part of your permanent medical record.    Lastly, your provider cannot access your [email or web address].  It is a service which communicates with the EHR (Electronic Health Record).  Sometimes there are errors in [employer] that staff and providers cannot see and these errors " are not part of your EHR.  Vaccines and screening reminders have been incorrect in MyChart.    We appreciate your respect for the limitations and boundaries of [employer].

## 2023-08-07 NOTE — PROGRESS NOTES
"Chief Complaint  Follow-up, Anxiety, and Flank Pain (Crampy feeling)    Subjective        Esa Lozano presents to [facility]  History of Present Illness    He is here to follow-up with me today.  Last time I saw him was February 2022.  He has hyperlipidemia, GERD and migraines.  Doing well overall other some abdominal bloating.  He is gluten intolerant and not sure if he contacted foods that would exacerbate.  Taking his statin, Maxalt and PPI.      Objective   Vital Signs:  /88 (BP Location: Left arm, Patient Position: Sitting, Cuff Size: Large Adult)   Pulse 71   Resp 18   Ht 177.8 cm (70\")   Wt 98.9 kg (218 lb)   SpO2 98%   BMI 31.28 kg/mý   Estimated body mass index is 31.28 kg/mý as calculated from the following:    Height as of this encounter: 177.8 cm (70\").    Weight as of this encounter: 98.9 kg (218 lb).             Physical Exam  Vitals and nursing note reviewed.   Constitutional:       General: He is not in acute distress.     Appearance: Normal appearance.   Cardiovascular:      Rate and Rhythm: Normal rate and regular rhythm.      Heart sounds: Normal heart sounds. No murmur heard.  Pulmonary:      Effort: Pulmonary effort is normal.      Breath sounds: Normal breath sounds.   Neurological:      Mental Status: He is alert.      Result Review :  The following data was reviewed by: Lucio Mcgrath MD on 08/07/2023:                 Assessment and Plan   Diagnoses and all orders for this visit:    1. Hyperlipidemia, mixed (Primary)  -     atorvastatin (LIPITOR) 40 MG tablet; Take 1 tablet by mouth Daily.  Dispense: 90 tablet; Refill: 4  -     CBC (No Diff)  -     Comprehensive Metabolic Panel  -     Lipid Panel With LDL / HDL Ratio    2. Gastroesophageal reflux disease without esophagitis  -     lansoprazole (PREVACID) 30 MG capsule; Take 1 capsule by mouth Daily. For GERD  Dispense: 90 capsule; Refill: 4    3. Migraine without aura and without status " migrainosus, not intractable  -     rizatriptan (MAXALT) 10 MG tablet; Take 1 tablet by mouth 1 (One) Time As Needed for Migraine. May repeat in 2 hours if needed  Dispense: 6 tablet; Refill: 11    4. Abdominal bloating        Clinically stable chronic conditions as above.  Continue all medications as above.  Labs reviewed/ordered as above.    Possibly could have come into contact with some gluten causing abdominal bloating.  Recommended simethicone when it occurs.  If that occurs frequently, I would recommend he make a follow-up appointment with his gastroenterologist Dr. Levin.           Follow Up   Return in about 6 months (around 2/7/2024) for Annual physical.  Patient was given instructions and counseling regarding his condition or for health maintenance advice. Please see specific information pulled into the AVS if appropriate.

## 2023-08-07 NOTE — TELEPHONE ENCOUNTER
I have placed the order in the system.  Nate, can this be added to the labs he got today?  If so, please add on.  Otherwise, he will need to be notified that we need more blood.

## 2023-08-07 NOTE — TELEPHONE ENCOUNTER
Testosterone was not discussed during the visit and I do not show any diagnosis of low T.  I need some type of symptom or problem to code it under for insurance to cover.  Examples: low energy, low libido...  I would also have to ask Nate if they got blood samples into the correct tubes for that test

## 2023-08-07 NOTE — TELEPHONE ENCOUNTER
Caller: Amanda Lozano    Relationship to patient: Emergency Contact    Best call back number: [phone]     Patient is needing: PATIENT'S WIFE STATES THAT PATIENT HAD LABS DONE TODAY 8/7/23 AND HE WANTS TO KNOW IF HIS TESTOSTERONE LEVEL CAN BE CHECKED TOO.    PLEASE ADVISE.

## 2023-08-08 DIAGNOSIS — R73.01 IFG (IMPAIRED FASTING GLUCOSE): Primary | ICD-10-CM

## 2023-08-08 DIAGNOSIS — R73.01 IFG (IMPAIRED FASTING GLUCOSE): ICD-10-CM

## 2023-08-08 LAB — HBA1C MFR BLD: 5.4 % (ref 4.8–5.6)

## 2023-08-09 PROBLEM — R73.01 IFG (IMPAIRED FASTING GLUCOSE): Chronic | Status: ACTIVE | Noted: 2023-08-09

## 2023-08-21 LAB
TESTOST FREE SERPL-MCNC: 6.7 PG/ML (ref 6.8–21.5)
TESTOST SERPL-MCNC: 424.1 NG/DL (ref 264–916)
WRITTEN AUTHORIZATION: NORMAL

## 2023-08-25 ENCOUNTER — TELEPHONE (OUTPATIENT)
Dept: ORTHOPEDIC SURGERY | Facility: CLINIC | Age: 48
End: 2023-08-25

## 2023-08-25 NOTE — TELEPHONE ENCOUNTER
"Caller: Esa Lozano \"KASEY\"    Relationship to patient: Self    Best call back number:     Chief complaint: BILATERAL KNEE PAIN    Type of visit: CORTISONE INJECTION    Requested date: ASAP        "

## 2023-08-31 ENCOUNTER — CLINICAL SUPPORT (OUTPATIENT)
Dept: ORTHOPEDIC SURGERY | Facility: CLINIC | Age: 48
End: 2023-08-31
Payer: COMMERCIAL

## 2023-08-31 VITALS — BODY MASS INDEX: 31.35 KG/M2 | TEMPERATURE: 97.1 F | WEIGHT: 219 LBS | HEIGHT: 70 IN

## 2023-08-31 DIAGNOSIS — R52 PAIN: Primary | ICD-10-CM

## 2023-08-31 DIAGNOSIS — M17.0 PRIMARY OSTEOARTHRITIS OF BOTH KNEES: ICD-10-CM

## 2023-08-31 RX ORDER — METHYLPREDNISOLONE ACETATE 80 MG/ML
80 INJECTION, SUSPENSION INTRA-ARTICULAR; INTRALESIONAL; INTRAMUSCULAR; SOFT TISSUE
Status: COMPLETED | OUTPATIENT
Start: 2023-08-31 | End: 2023-08-31

## 2023-08-31 RX ADMIN — METHYLPREDNISOLONE ACETATE 80 MG: 80 INJECTION, SUSPENSION INTRA-ARTICULAR; INTRALESIONAL; INTRAMUSCULAR; SOFT TISSUE at 18:21

## 2023-08-31 NOTE — PROGRESS NOTES
8/31/2023    Esa Lozano is here today for worsening knee pain. Pt has undergone injection of the knee in the past with good resolution of symptoms. Pt is requesting a repeat injection.     KNEE Injection Procedure Note:    Large Joint Arthrocentesis: R knee  Date/Time: 8/31/2023 6:21 PM  Consent given by: patient  Site marked: site marked  Timeout: Immediately prior to procedure a time out was called to verify the correct patient, procedure, equipment, support staff and site/side marked as required   Supporting Documentation  Indications: pain and joint swelling   Procedure Details  Location: knee - R knee  Preparation: Patient was prepped and draped in the usual sterile fashion  Needle size: 22 G  Approach: anterolateral  Medications administered: 80 mg methylPREDNISolone acetate 80 MG/ML; 2 mL lidocaine (cardiac)  Patient tolerance: patient tolerated the procedure well with no immediate complications      Large Joint Arthrocentesis: L knee  Date/Time: 8/31/2023 6:21 PM  Consent given by: patient  Site marked: site marked  Timeout: Immediately prior to procedure a time out was called to verify the correct patient, procedure, equipment, support staff and site/side marked as required   Supporting Documentation  Indications: pain and joint swelling   Procedure Details  Location: knee - L knee  Preparation: Patient was prepped and draped in the usual sterile fashion  Needle size: 22 G  Approach: anterolateral  Medications administered: 80 mg methylPREDNISolone acetate 80 MG/ML; 2 mL lidocaine (cardiac)  Patient tolerance: patient tolerated the procedure well with no immediate complications    X-rays 3 views of both knees AP lateral sunrise were ordered and reviewed for evaluation of knee pain he has right greater than left knee osteoarthritis with some narrowing of the medial joint space moderate in nature.  In comparison with previous films are has been slight progression      At the conclusion of the injection  I discussed the importance of continued quad strengthening exercises on a daily basis. I will see the patient back if the symptoms should fail to improve or worsen.    Jose Levin MD  8/31/2023

## 2023-09-08 DIAGNOSIS — M17.0 PRIMARY OSTEOARTHRITIS OF BOTH KNEES: Primary | ICD-10-CM

## 2023-09-19 ENCOUNTER — TREATMENT (OUTPATIENT)
Dept: PHYSICAL THERAPY | Facility: CLINIC | Age: 48
End: 2023-09-19
Payer: COMMERCIAL

## 2023-09-19 DIAGNOSIS — G89.29 CHRONIC PAIN OF BOTH KNEES: Primary | ICD-10-CM

## 2023-09-19 DIAGNOSIS — M25.562 CHRONIC PAIN OF BOTH KNEES: Primary | ICD-10-CM

## 2023-09-19 DIAGNOSIS — M17.10 ARTHRITIS OF KNEE: ICD-10-CM

## 2023-09-19 DIAGNOSIS — M25.561 CHRONIC PAIN OF BOTH KNEES: Primary | ICD-10-CM

## 2023-09-19 NOTE — PROGRESS NOTES
Physical Therapy Initial Evaluation and Plan of Care    [facility]  [address] [place] [address]  [phone] (phone)  [phone] (fax)    Patient: Esa Lozano   : 1975  Diagnosis/ICD-10 Code:  Chronic pain of both knees [M25.561, M25.562, G89.29]  Referring practitioner: Jose Levin MD  Date of Initial Visit: 2023  Today's Date: 2023  Patient seen for 1 sessions           Subjective Evaluation    History of Present Illness  Mechanism of injury: Patient complains of B chronic knee pain that dates back 10 years. R knee is slightly worse compared to L knee. Patient works as  which requires him to climb on ladders and kneel for parts of the day.     Patient complains of posterior knee pain after he has to lean forward on ladder. He has significant pain with stairs as well as generalized stiffness with prolonged sitting.     He has managed pain with injections which gives him relief for about a month      X-rays 3 views of both knees AP lateral sunrise were ordered and reviewed for evaluation of knee pain he has right greater than left knee osteoarthritis with some narrowing of the medial joint space moderate in nature. In comparison with previous films are has been slight progression    PLOF: Works out at home, on ladders/kneeling as      PMH: hyperlipidemia      Patient Occupation:  Quality of life: good    Pain  Current pain ratin  At best pain ratin  At worst pain ratin  Location: B knees  Quality: dull ache and sharp  Relieving factors: relaxation, rest, support, ice and heat  Aggravating factors: stairs, standing, ambulation and squatting  Progression: worsening    Social Support  Lives in: multiple-level home  Lives with: spouse and young children    Diagnostic Tests  X-ray: abnormal    Treatments  Previous treatment: injection treatment and physical therapy  Patient Goals  Patient goals for therapy:  increased strength, decreased pain, increased motion, return to sport/leisure activities and decreased edema           Objective          Active Range of Motion   Left Knee   Flexion: 130 degrees   Extension: 0 degrees     Right Knee   Flexion: 130 degrees   Extension: 0 degrees     Patellar Mobility   Left Knee Patellar tendons within functional limits include the medial, lateral, superior and inferior.     Right Knee Patellar tendons within functional limits include the medial, lateral, superior and inferior.     Strength/Myotome Testing     Left Hip   Planes of Motion   Flexion: 4+    Right Hip   Planes of Motion   Flexion: 4+    Left Knee   Flexion: 4  Extension: 4+    Right Knee   Flexion: 4+  Extension: 4+    Left Ankle/Foot   Dorsiflexion: 4+    Right Ankle/Foot   Dorsiflexion: 4+    Tests     Additional Tests Details  *Special tests not completed due to severe B knee OA confirmed on X-Ray    Swelling     Left Knee Girth Measurement (cm)   Joint line: 38 cm    Right Knee Girth Measurement (cm)   Joint line: 38 cm    Ambulation     Comments   Even step length, no notable antalgia.       See Exercise, Manual, and Modality Logs for complete treatment.       Functional Outcome Score: LEFS=59/80        Assessment & Plan       Assessment  Impairments: activity intolerance, lacks appropriate home exercise program and pain with function   Functional limitations: sleeping, walking, uncomfortable because of pain, sitting and standing   Assessment details: Esa Lozano is a 47 y.o. year-old male referred to physical therapy for chronic B knee pain due to OA. He presents with a evolving clinical presentation.  He has comorbidities hyperlipidemia and personal factors of having to climb ladders and kneel for work which may affect his progress in the plan of care.  Signs and symptoms are consistent with physical therapy diagnosis of B knee pain. Pt was educated on course of treatment, activity modifications, and use of  ice/heat PRN. Pt was given a copy of HEP. Patient is appropriate for skilled physical therapy in order to reduce pain and increase ease with daily mobility. Plan to mostly focus on HEP and have patient return for exercise adjustment or progression PRN.   Barriers to therapy: none identified  Prognosis: good    Goals  Plan Goals: STGs to be completed within 30 days:  -Patient will demonstrate compliance and independence with initial HEP    LTGs to be completed within 90 days:  -Patient will improve score on LEFS from 59 at eval to 70 or greater to improve quality of life    Plan  Therapy options: will be seen for skilled therapy services  Planned modality interventions: TENS, ultrasound, electrical stimulation/Russian stimulation, dry needling and thermotherapy (hydrocollator packs)  Planned therapy interventions: joint mobilization, stretching, strengthening, therapeutic activities, transfer training, manual therapy, ADL retraining, balance/weight-bearing training, flexibility, functional ROM exercises, gait training, home exercise program, neuromuscular re-education and motor coordination training  Other planned therapy interventions: Aquatic Therapy  Frequency: 1x week (36 visits)  Treatment plan discussed with: patient  Plan details: Gait training, stairs, Balance, Knee ROM/strength, LE stability      Timed:  Manual Therapy:         mins  80760;  Therapeutic Exercise:    15     mins  22132;     Neuromuscular Kg:        mins  72236;    Therapeutic Activity:     10     mins  18165;     Gait Training:           mins  86570;     Ultrasound:          mins  14205;    Iontophoresis         mins 57956    Untimed:  Electrical Stimulation:         mins  35809 ( );  Traction:       mins  26790;   Dry Needling   (1-2 muscles)   _     mins 81221 (Self-pay)  Dry Needling (3-4 muscles)  _     mins 44923 (Self-pay)  Dry Needling Trial    _     mins DRYNDLTRIAL  (No Charge)  Low Eval     20     Mins  65027  Mod Eval           Mins  05768  High Eval                            Mins  23896  Re- Eval                           Mins  90741    Timed Treatment:   25   mins   Total Treatment:     45   mins    PT SIGNATURE: Chloé Reeves PT     License Number: [place] [id number]    Electronically signed by Chloé Reeves PT, 09/19/23, 9:08 AM EDT    DATE TREATMENT INITIATED: 9/19/2023    Initial Certification  Certification Period: 12/18/2023  I certify that the therapy services are furnished while this patient is under my care.  The services outlined above are required by this patient, and will be reviewed every 90 days.     PHYSICIAN: Jose Levin MD   NPI: [id number]                                         DATE:     Please sign and return via fax to [phone] Thank you, [facility].

## 2023-10-11 ENCOUNTER — DOCUMENTATION (OUTPATIENT)
Dept: PHYSICAL THERAPY | Facility: CLINIC | Age: 48
End: 2023-10-11
Payer: COMMERCIAL

## 2023-10-11 DIAGNOSIS — G89.29 CHRONIC PAIN OF BOTH KNEES: ICD-10-CM

## 2023-10-11 DIAGNOSIS — M25.562 CHRONIC PAIN OF BOTH KNEES: ICD-10-CM

## 2023-10-11 DIAGNOSIS — M25.561 CHRONIC PAIN OF BOTH KNEES: ICD-10-CM

## 2023-10-11 DIAGNOSIS — M17.10 ARTHRITIS OF KNEE: Primary | ICD-10-CM

## 2023-11-13 ENCOUNTER — OFFICE VISIT (OUTPATIENT)
Dept: CARDIOLOGY | Facility: CLINIC | Age: 48
End: 2023-11-13
Payer: COMMERCIAL

## 2023-11-13 VITALS
DIASTOLIC BLOOD PRESSURE: 90 MMHG | WEIGHT: 222.2 LBS | HEIGHT: 70 IN | HEART RATE: 89 BPM | BODY MASS INDEX: 31.81 KG/M2 | OXYGEN SATURATION: 98 % | SYSTOLIC BLOOD PRESSURE: 130 MMHG

## 2023-11-13 DIAGNOSIS — E78.2 HYPERLIPIDEMIA, MIXED: Primary | Chronic | ICD-10-CM

## 2023-11-13 DIAGNOSIS — F41.9 ANXIETY: ICD-10-CM

## 2023-11-13 PROCEDURE — 99214 OFFICE O/P EST MOD 30 MIN: CPT | Performed by: NURSE PRACTITIONER

## 2023-11-13 PROCEDURE — 93000 ELECTROCARDIOGRAM COMPLETE: CPT | Performed by: NURSE PRACTITIONER

## 2023-11-13 NOTE — PROGRESS NOTES
Date of Office Visit: 2023  Encounter Provider: MATILDA Frost  Place of Service: [facility] CARDIOLOGY  Patient Name: Esa Lozano  :1975    No chief complaint on file.  : fluttering    HPI: Esa Lozano is a 48 y.o. male who is a patient of  Dr. Sharp.  Previously seen in the office on 2022 at the request of his PCP for chest pain.  He had presented to the emergency department after several days of chest pain.  He noticed it was aggravated if he was around noxious smells.  Did not have any exertional chest pain or dyspnea.  He had a normal stress test in .     On same day of his office appointment , he completed a treadmill stress test and exercised for 9 minutes without chest pain or dyspnea.  No further cardiac recommendations as his pain sounded musculoskeletal.    Patient presents today with concern willing his heart fluttering at night.  He noticed it happened after he had steroid injection in his knee.  This started about 2 and half weeks ago.  At this time, he only has some fluttering when he lays down at night.  He notes that he has had some episodes of anxiety over the years.  He was previously on Xanax which lasted him a couple of years that he took on an as-needed basis.  He does have a pretty stressful job.  His blood pressure is well controlled.  He is does not have diabetes.  He previously smoked but quit about 17 years ago.  He has no family history of cardiac disease.  Lipid panel is in target range on statin therapy.      Previous testing and notes have been reviewed by me.   Past Medical History:   Diagnosis Date    SAKINA (generalized anxiety disorder)     GERD (gastroesophageal reflux disease)     Hyperlipidemia     Migraine     Osteoarthritis     Seasonal allergies        Past Surgical History:   Procedure Laterality Date    HAND SURGERY  2011    1st digit left hand, accidently cut with a chain saw, 26 stitches.  LOCAL ONLY    KNEE ARTHROSCOPY Right 2018    Procedure: RIGHT KNEE ARTHROSCOPY, PARTIAL MEDIAL MENISECTOMY, EXTENSIVE CHONDROPLASTY AND MICRO FRACTURE, REMOVAL OF 8 LOOSE BODIES ;  Surgeon: Ihsan Fontanez MD;  Location: [facility];  Service:     KNEE SURGERY Right 2018    arthroscopy    TRANSESOPHAGEAL ECHOCARDIOGRAM (JERI)  2015    [facility]       Social History     Socioeconomic History    Marital status:     Number of children: 2   Tobacco Use    Smoking status: Former     Packs/day: 1.00     Years: 18.00     Additional pack years: 0.00     Total pack years: 18.00     Types: Cigarettes     Quit date: 2004     Years since quittin.8    Smokeless tobacco: Never   Vaping Use    Vaping Use: Never used   Substance and Sexual Activity    Alcohol use: Not Currently     Comment: RARE    Drug use: No    Sexual activity: Yes     Partners: Female     Birth control/protection: None, Vasectomy       Family History   Problem Relation Age of Onset    Alcohol abuse Other     Arthritis Other     Cancer Other     Diabetes Other     Migraines Other     Diabetes Mother     Migraines Mother        Review of Systems   Constitutional: Negative.   HENT: Negative.     Eyes: Negative.    Cardiovascular: Negative.    Respiratory: Negative.     Endocrine: Negative.    Hematologic/Lymphatic: Negative.    Skin: Negative.    Musculoskeletal: Negative.    Gastrointestinal: Negative.    Genitourinary: Negative.    Neurological: Negative.    Psychiatric/Behavioral: Negative.     Allergic/Immunologic: Negative.        Allergies   Allergen Reactions    Gluten Meal GI Intolerance         Current Outpatient Medications:     atorvastatin (LIPITOR) 40 MG tablet, Take 1 tablet by mouth Daily., Disp: 90 tablet, Rfl: 4    lansoprazole (PREVACID) 30 MG capsule, Take 1 capsule by mouth Daily. For GERD, Disp: 90 capsule, Rfl: 4    rizatriptan (MAXALT) 10 MG tablet, Take 1 tablet by mouth 1 (One) Time As  "Needed for Migraine. May repeat in 2 hours if needed, Disp: 6 tablet, Rfl: 11    vitamin C (ASCORBIC ACID) 250 MG tablet, Take 1 tablet by mouth Daily., Disp: , Rfl:     Vitamin D, Cholecalciferol, (CHOLECALCIFEROL) 10 MCG (400 UNIT) tablet, Take 1 tablet by mouth Daily., Disp: , Rfl:       Objective:     Vitals:    11/13/23 1336   BP: 130/90   Pulse: 89   SpO2: 98%   Weight: 101 kg (222 lb 3.2 oz)   Height: 177.8 cm (70\")     Body mass index is 31.88 kg/m².    PHYSICAL EXAM:    Constitutional:       Appearance: Healthy appearance. Not in distress.   Neck:      Vascular: No JVR. JVD normal.   Pulmonary:      Effort: Pulmonary effort is normal.      Breath sounds: Normal breath sounds. No wheezing. No rhonchi. No rales.   Chest:      Chest wall: Not tender to palpatation.   Cardiovascular:      PMI at left midclavicular line. Normal rate. Regular rhythm. Normal S1. Normal S2.       Murmurs: There is no murmur.      No gallop.  No click. No rub.   Pulses:     Intact distal pulses.   Edema:     Peripheral edema absent.   Abdominal:      General: Bowel sounds are normal.      Palpations: Abdomen is soft.      Tenderness: There is no abdominal tenderness.   Musculoskeletal: Normal range of motion.         General: No tenderness. Skin:     General: Skin is warm and dry.   Neurological:      General: No focal deficit present.      Mental Status: Alert and oriented to person, place and time.           ECG 12 Lead    Date/Time: 11/13/2023 2:33 PM  Performed by: Ruth Naqvi APRN    Authorized by: Ruth Naqvi APRN  Comparison: compared with previous ECG from 1/17/2022  Similar to previous ECG  Rhythm: sinus rhythm  BPM: 85  ST Segments: ST segments normal  T Waves: T waves normal            Assessment:       Diagnosis Plan   1. Hyperlipidemia, mixed        2. Anxiety          No orders of the defined types were placed in this encounter.         Plan:       1.  Hyperlipidemia: Lipid panel in target range.  On " statin therapy  2.  Palpitations: Improved. Patient felt some fluttering in his chest when he would lay down at night it was worse about 2 weeks ago, now it is better.  Patient does endorse some anxiety.  He has tried a couple different medications that have left him feeling pretty foggy.  He states that his wife who is a pharmacist listen to his chest during those times of fluttering and said it sounded like his heart was in a regular rhythm.  Patient has never had sleep study.  I have recommended him to have an overnight sleep study.    Mr. Lozano is stable from a cardiac standpoint.  He will see us on an as-needed basis.  I recommended that he speak with his PCP regarding his anxiety and sleep study.          Your medication list            Accurate as of November 13, 2023  2:32 PM. If you have any questions, ask your nurse or doctor.                CONTINUE taking these medications        Instructions Last Dose Given Next Dose Due   atorvastatin 40 MG tablet  Commonly known as: LIPITOR      Take 1 tablet by mouth Daily.       lansoprazole 30 MG capsule  Commonly known as: PREVACID      Take 1 capsule by mouth Daily. For GERD       rizatriptan 10 MG tablet  Commonly known as: MAXALT      Take 1 tablet by mouth 1 (One) Time As Needed for Migraine. May repeat in 2 hours if needed       vitamin C 250 MG tablet  Commonly known as: ASCORBIC ACID      Take 1 tablet by mouth Daily.       Vitamin D (Cholecalciferol) 10 MCG (400 UNIT) tablet  Commonly known as: CHOLECALCIFEROL      Take 1 tablet by mouth Daily.                  As always, it has been a pleasure to participate in your patient's care.      Sincerely,       MATILDA Campbell

## 2024-02-05 ENCOUNTER — TELEPHONE (OUTPATIENT)
Dept: ORTHOPEDIC SURGERY | Facility: CLINIC | Age: 49
End: 2024-02-05

## 2024-02-05 NOTE — TELEPHONE ENCOUNTER
"Caller: Esa Lozano \"KASEY\"    Relationship to patient: Self    Best call back number: [phone] (home)     Patient is needing: PATIENT HAS GOTTEN methylPREDNISolone Acetate 80 mg MULTIPLE TIMES WITH DR LAWRENCE. THE LAST ONE HE RECEIVED 8/31/23 HE STATED HE WAS HAVING IRREGULAR HEARTBEAT FOR A COUPLE MONTHS AFTER AND WASN'T SURE IF THOSE WERE TO BLAME. HE WANTS TO TRY GETTING A GEL INJECTION IN HIS KNEES NOW.     PLEASE ADVISE PATIENT ON GETTING GEL INJECTIONS.   "

## 2024-03-05 ENCOUNTER — CLINICAL SUPPORT (OUTPATIENT)
Dept: ORTHOPEDIC SURGERY | Facility: CLINIC | Age: 49
End: 2024-03-05
Payer: COMMERCIAL

## 2024-03-05 VITALS — BODY MASS INDEX: 31.78 KG/M2 | HEIGHT: 70 IN | WEIGHT: 222 LBS | TEMPERATURE: 97.7 F

## 2024-03-05 DIAGNOSIS — M17.0 PRIMARY OSTEOARTHRITIS OF BOTH KNEES: Primary | ICD-10-CM

## 2024-03-08 ENCOUNTER — OFFICE VISIT (OUTPATIENT)
Dept: INTERNAL MEDICINE | Facility: CLINIC | Age: 49
End: 2024-03-08
Payer: COMMERCIAL

## 2024-03-08 VITALS
HEART RATE: 82 BPM | HEIGHT: 70 IN | SYSTOLIC BLOOD PRESSURE: 140 MMHG | OXYGEN SATURATION: 97 % | RESPIRATION RATE: 18 BRPM | WEIGHT: 223 LBS | BODY MASS INDEX: 31.92 KG/M2 | DIASTOLIC BLOOD PRESSURE: 90 MMHG

## 2024-03-08 DIAGNOSIS — Z13.29 SCREENING FOR THYROID DISORDER: ICD-10-CM

## 2024-03-08 DIAGNOSIS — R73.01 IFG (IMPAIRED FASTING GLUCOSE): Chronic | ICD-10-CM

## 2024-03-08 DIAGNOSIS — G43.009 MIGRAINE WITHOUT AURA AND WITHOUT STATUS MIGRAINOSUS, NOT INTRACTABLE: Chronic | ICD-10-CM

## 2024-03-08 DIAGNOSIS — E78.2 HYPERLIPIDEMIA, MIXED: Chronic | ICD-10-CM

## 2024-03-08 DIAGNOSIS — Z00.00 ENCOUNTER FOR HEALTH MAINTENANCE EXAMINATION IN ADULT: Primary | ICD-10-CM

## 2024-03-08 PROCEDURE — 99396 PREV VISIT EST AGE 40-64: CPT | Performed by: FAMILY MEDICINE

## 2024-03-08 RX ORDER — RIZATRIPTAN BENZOATE 10 MG/1
10 TABLET ORAL ONCE AS NEEDED
Qty: 12 TABLET | Refills: 11 | Status: SHIPPED | OUTPATIENT
Start: 2024-03-08

## 2024-03-08 NOTE — PROGRESS NOTES
"Chief Complaint   Patient presents with    Annual Exam       Subjective       CPE- Patient is here today for annual physical.  No new health complaints or concerns.  Taking meds as prescribed.      Current Outpatient Medications:     atorvastatin (LIPITOR) 40 MG tablet, Take 1 tablet by mouth Daily., Disp: 90 tablet, Rfl: 4    lansoprazole (PREVACID) 30 MG capsule, Take 1 capsule by mouth Daily. For GERD, Disp: 90 capsule, Rfl: 4    rizatriptan (MAXALT) 10 MG tablet, Take 1 tablet by mouth 1 (One) Time As Needed for Migraine. May repeat in 2 hours if needed, Disp: 12 tablet, Rfl: 11    vitamin C (ASCORBIC ACID) 250 MG tablet, Take 1 tablet by mouth Daily., Disp: , Rfl:     Vitamin D, Cholecalciferol, (CHOLECALCIFEROL) 10 MCG (400 UNIT) tablet, Take 1 tablet by mouth Daily., Disp: , Rfl:       Labs: Due for routine labs    Colorectal cancer screening: UTD, next due 2025      Vitals:    03/08/24 0933   BP: 140/90   BP Location: Left arm   Patient Position: Sitting   Cuff Size: Small Adult   Pulse: 82   Resp: 18   SpO2: 97%   Weight: 101 kg (223 lb)   Height: 177.8 cm (70\")     Body mass index is 32 kg/m².           Physical Exam  Vitals and nursing note reviewed.   Constitutional:       General: He is not in acute distress.     Appearance: Normal appearance.   HENT:      Right Ear: Tympanic membrane, ear canal and external ear normal.      Left Ear: Tympanic membrane, ear canal and external ear normal.      Nose: Nose normal.      Mouth/Throat:      Mouth: Mucous membranes are moist.   Eyes:      General:         Right eye: No discharge.         Left eye: No discharge.      Conjunctiva/sclera: Conjunctivae normal.   Cardiovascular:      Rate and Rhythm: Normal rate and regular rhythm.      Pulses: Normal pulses.      Heart sounds: Normal heart sounds.   Pulmonary:      Effort: Pulmonary effort is normal.      Breath sounds: Normal breath sounds.   Abdominal:      General: Bowel sounds are normal. There is no " distension.      Palpations: Abdomen is soft.      Tenderness: There is no abdominal tenderness.   Musculoskeletal:      Cervical back: Neck supple.      Right lower leg: No edema.      Left lower leg: No edema.   Lymphadenopathy:      Cervical: No cervical adenopathy.   Skin:     General: Skin is warm.      Findings: No rash.   Neurological:      General: No focal deficit present.      Mental Status: He is alert. Mental status is at baseline.   Psychiatric:         Mood and Affect: Mood normal.         Behavior: Behavior normal.           Common labs          8/7/2023    09:49   Common Labs   Glucose 110    BUN 15    Creatinine 0.97    Sodium 142    Potassium 4.7    Chloride 105    Calcium 10.0    Total Protein 6.6    Albumin 4.4    Total Bilirubin 0.7    Alkaline Phosphatase 59    AST (SGOT) 72    ALT (SGPT) 130    WBC 5.06    Hemoglobin 15.7    Hematocrit 45.2    Platelets 169    Total Cholesterol 133    Triglycerides 117    HDL Cholesterol 36    LDL Cholesterol  76    Hemoglobin A1C 5.40            Diagnoses and all orders for this visit:    1. Encounter for health maintenance examination in adult (Primary)  -     TSH Rfx On Abnormal To Free T4; Future    2. IFG (impaired fasting glucose)  -     Comprehensive Metabolic Panel; Future  -     Hemoglobin A1c; Future    3. Hyperlipidemia, mixed  -     CBC (No Diff); Future  -     Comprehensive Metabolic Panel; Future  -     Lipid Panel With LDL / HDL Ratio; Future    4. Screening for thyroid disorder  -     TSH Rfx On Abnormal To Free T4; Future    5. Migraine without aura and without status migrainosus, not intractable  -     rizatriptan (MAXALT) 10 MG tablet; Take 1 tablet by mouth 1 (One) Time As Needed for Migraine. May repeat in 2 hours if needed  Dispense: 12 tablet; Refill: 11           The patient was counseled regarding nutrition, physical activity, healthy weight, injury prevention, misuse of tobacco, alcohol and illicit drugs, mental health, immunizations,  and screenings.    Return in about 1 year (around 3/8/2025).

## 2024-03-27 RX ORDER — METHYLPREDNISOLONE ACETATE 80 MG/ML
80 INJECTION, SUSPENSION INTRA-ARTICULAR; INTRALESIONAL; INTRAMUSCULAR; SOFT TISSUE
Status: COMPLETED | OUTPATIENT
Start: 2024-03-27 | End: 2024-03-27

## 2024-03-27 RX ADMIN — METHYLPREDNISOLONE ACETATE 80 MG: 80 INJECTION, SUSPENSION INTRA-ARTICULAR; INTRALESIONAL; INTRAMUSCULAR; SOFT TISSUE at 06:36

## 2024-03-27 NOTE — PROGRESS NOTES
3/27/2024    Esa Lozano is here today for worsening knee pain. Pt has undergone injection of the knee in the past with good resolution of symptoms. Pt is requesting a repeat injection.     KNEE Injection Procedure Note:    Large Joint Arthrocentesis: R knee  Date/Time: 3/27/2024 6:36 AM  Consent given by: patient  Site marked: site marked  Timeout: Immediately prior to procedure a time out was called to verify the correct patient, procedure, equipment, support staff and site/side marked as required   Supporting Documentation  Indications: pain and joint swelling   Procedure Details  Location: knee - R knee  Preparation: Patient was prepped and draped in the usual sterile fashion  Needle size: 22 G  Approach: anterolateral  Medications administered: 80 mg methylPREDNISolone acetate 80 MG/ML; 2 mL lidocaine (cardiac)  Patient tolerance: patient tolerated the procedure well with no immediate complications      Large Joint Arthrocentesis: L knee  Date/Time: 3/27/2024 6:36 AM  Consent given by: patient  Site marked: site marked  Timeout: Immediately prior to procedure a time out was called to verify the correct patient, procedure, equipment, support staff and site/side marked as required   Supporting Documentation  Indications: pain and joint swelling   Procedure Details  Location: knee - L knee  Preparation: Patient was prepped and draped in the usual sterile fashion  Needle size: 22 G  Approach: anterolateral  Medications administered: 80 mg methylPREDNISolone acetate 80 MG/ML; 2 mL lidocaine (cardiac)  Patient tolerance: patient tolerated the procedure well with no immediate complications      At the conclusion of the injection I discussed the importance of continued quad strengthening exercises on a daily basis. I will see the patient back if the symptoms should fail to improve or worsen.    Jose Levin MD  3/27/2024

## 2024-06-04 ENCOUNTER — TELEPHONE (OUTPATIENT)
Dept: INTERNAL MEDICINE | Facility: CLINIC | Age: 49
End: 2024-06-04
Payer: COMMERCIAL

## 2024-06-04 NOTE — TELEPHONE ENCOUNTER
Edvin Lozano called to schedule his laboratory appointment for this Thursday 06/06/2024 at 9:15 AM. Patient asked if Dr Mcgrath could add a laboratory test to check his testosterone when he comes in for the appointment   
Will you add testosterone lab ?  
[id number]

## 2024-06-05 DIAGNOSIS — Z13.29 SCREENING FOR THYROID DISORDER: ICD-10-CM

## 2024-06-05 DIAGNOSIS — Z01.89 PATIENT REQUEST FOR DIAGNOSTIC TESTING: ICD-10-CM

## 2024-06-05 DIAGNOSIS — R73.01 IFG (IMPAIRED FASTING GLUCOSE): ICD-10-CM

## 2024-06-05 DIAGNOSIS — Z00.00 ENCOUNTER FOR HEALTH MAINTENANCE EXAMINATION IN ADULT: ICD-10-CM

## 2024-06-05 DIAGNOSIS — Z00.00 ENCOUNTER FOR HEALTH MAINTENANCE EXAMINATION IN ADULT: Primary | ICD-10-CM

## 2024-06-05 DIAGNOSIS — E78.2 HYPERLIPIDEMIA, MIXED: ICD-10-CM

## 2024-06-06 LAB
ALBUMIN SERPL-MCNC: 4.5 G/DL (ref 3.5–5.2)
ALBUMIN/GLOB SERPL: 2 G/DL
ALP SERPL-CCNC: 67 U/L (ref 39–117)
ALT SERPL-CCNC: 176 U/L (ref 1–41)
AST SERPL-CCNC: 110 U/L (ref 1–40)
BILIRUB SERPL-MCNC: 0.8 MG/DL (ref 0–1.2)
BUN SERPL-MCNC: 15 MG/DL (ref 6–20)
BUN/CREAT SERPL: 17 (ref 7–25)
CALCIUM SERPL-MCNC: 9.6 MG/DL (ref 8.6–10.5)
CHLORIDE SERPL-SCNC: 103 MMOL/L (ref 98–107)
CHOLEST SERPL-MCNC: 119 MG/DL (ref 0–200)
CO2 SERPL-SCNC: 27 MMOL/L (ref 22–29)
CREAT SERPL-MCNC: 0.88 MG/DL (ref 0.76–1.27)
EGFRCR SERPLBLD CKD-EPI 2021: 106.1 ML/MIN/1.73
ERYTHROCYTE [DISTWIDTH] IN BLOOD BY AUTOMATED COUNT: 12.5 % (ref 12.3–15.4)
GLOBULIN SER CALC-MCNC: 2.3 GM/DL
GLUCOSE SERPL-MCNC: 111 MG/DL (ref 65–99)
HBA1C MFR BLD: 5.4 % (ref 4.8–5.6)
HCT VFR BLD AUTO: 47.3 % (ref 37.5–51)
HDLC SERPL-MCNC: 38 MG/DL (ref 40–60)
HGB BLD-MCNC: 16.4 G/DL (ref 13–17.7)
LDLC SERPL CALC-MCNC: 57 MG/DL (ref 0–100)
LDLC/HDLC SERPL: 1.41 {RATIO}
MCH RBC QN AUTO: 31.9 PG (ref 26.6–33)
MCHC RBC AUTO-ENTMCNC: 34.7 G/DL (ref 31.5–35.7)
MCV RBC AUTO: 92 FL (ref 79–97)
PLATELET # BLD AUTO: 148 10*3/MM3 (ref 140–450)
POTASSIUM SERPL-SCNC: 4.8 MMOL/L (ref 3.5–5.2)
PROT SERPL-MCNC: 6.8 G/DL (ref 6–8.5)
RBC # BLD AUTO: 5.14 10*6/MM3 (ref 4.14–5.8)
SODIUM SERPL-SCNC: 140 MMOL/L (ref 136–145)
TRIGL SERPL-MCNC: 137 MG/DL (ref 0–150)
TSH SERPL DL<=0.005 MIU/L-ACNC: 2.31 UIU/ML (ref 0.27–4.2)
VLDLC SERPL CALC-MCNC: 24 MG/DL (ref 5–40)
WBC # BLD AUTO: 4.71 10*3/MM3 (ref 3.4–10.8)

## 2024-06-11 ENCOUNTER — TELEPHONE (OUTPATIENT)
Dept: INTERNAL MEDICINE | Facility: CLINIC | Age: 49
End: 2024-06-11
Payer: COMMERCIAL

## 2024-06-11 NOTE — TELEPHONE ENCOUNTER
"Name: Esa Lozano \"Mitchel\"      Relationship: Self      Best Callback Number: [phone]      HUB PROVIDED THE RELAY MESSAGE FROM THE OFFICE      PATIENT: HAS FURTHER QUESTIONS AND WOULD LIKE A CALL BACK AT THE FOLLOWING PHONE [phone]    ADDITIONAL INFORMATION:  PATIENT SAID HE WOULD SEE THE GASTROENTEROLOGIST   "

## 2024-06-11 NOTE — TELEPHONE ENCOUNTER
Caller: Amanda Lozano    Relationship: Emergency Contact    Best call back number: [phone]     What is the best time to reach you: ANYTIME    Who are you requesting to speak with (clinical staff, provider,  specific staff member): CLINICAL    What was the call regarding: PATIENTS WIFE STATED THE PATIENT INFORMED HER HE RECEIVED A CALL STATING HE WOULD BE RECEIVING A REFERRAL TO GASTROENTEROLOGY DUE TO HIS LAB RESULTS.    PATIENTS WIFE STATED THE PATIENT DOES NOT NEED A REFERRAL, AS HE ALREADY HAS A GASTROENTEROLOGIST.    PATIENTS WIFE IS REQUESTING THE LAB RESULTS FROM 06- BE SENT TO THE PATIENTS CURRENT GASTROENTEROLOGIST, DR ISAAK LAWRENCE WITH [facility].    DR ISAAK VAZQUEZ INFORMATIONS IS:    PHONE: [phone]  ADDRESS: [address] [place] [address], [address], [place] [address]    Is it okay if the provider responds through [employer]: YES

## 2024-06-11 NOTE — TELEPHONE ENCOUNTER
Hub okay to rely message       ----- Message from Lucio Alcides sent at 6/7/2024  1:49 PM EDT -----  Please let him know that his liver enzymes were more elevated than usual and I would recommend he see a gastroenterologist.  The rest of his labs were stable other than a mildly elevated blood sugar.  If he is willing to see the gastro, let me know and I can put in the order.

## 2024-06-13 ENCOUNTER — OFFICE (AMBULATORY)
Dept: URBAN - METROPOLITAN AREA CLINIC 76 | Facility: CLINIC | Age: 49
End: 2024-06-13

## 2024-06-13 VITALS
RESPIRATION RATE: 20 BRPM | HEIGHT: 70 IN | HEART RATE: 70 BPM | DIASTOLIC BLOOD PRESSURE: 91 MMHG | DIASTOLIC BLOOD PRESSURE: 89 MMHG | WEIGHT: 227 LBS | SYSTOLIC BLOOD PRESSURE: 154 MMHG | SYSTOLIC BLOOD PRESSURE: 145 MMHG

## 2024-06-13 DIAGNOSIS — R74.8 ABNORMAL LEVELS OF OTHER SERUM ENZYMES: ICD-10-CM

## 2024-06-13 DIAGNOSIS — K21.9 GASTRO-ESOPHAGEAL REFLUX DISEASE WITHOUT ESOPHAGITIS: ICD-10-CM

## 2024-06-13 DIAGNOSIS — K86.81 EXOCRINE PANCREATIC INSUFFICIENCY: ICD-10-CM

## 2024-06-13 DIAGNOSIS — K90.0 CELIAC DISEASE: ICD-10-CM

## 2024-06-13 PROCEDURE — 99214 OFFICE O/P EST MOD 30 MIN: CPT | Performed by: NURSE PRACTITIONER

## 2024-07-08 ENCOUNTER — OFFICE (AMBULATORY)
Dept: URBAN - METROPOLITAN AREA CLINIC 46 | Facility: CLINIC | Age: 49
End: 2024-07-08
Payer: COMMERCIAL

## 2024-07-08 VITALS — HEIGHT: 70 IN

## 2024-07-08 DIAGNOSIS — R74.8 ABNORMAL LEVELS OF OTHER SERUM ENZYMES: ICD-10-CM

## 2024-07-08 DIAGNOSIS — K76.0 FATTY (CHANGE OF) LIVER, NOT ELSEWHERE CLASSIFIED: ICD-10-CM

## 2024-07-08 PROCEDURE — 76981 USE PARENCHYMA: CPT | Performed by: NURSE PRACTITIONER

## 2024-09-09 ENCOUNTER — OFFICE VISIT (OUTPATIENT)
Dept: ORTHOPEDIC SURGERY | Facility: CLINIC | Age: 49
End: 2024-09-09
Payer: COMMERCIAL

## 2024-09-09 VITALS — HEIGHT: 70 IN | WEIGHT: 210.2 LBS | TEMPERATURE: 97.1 F | BODY MASS INDEX: 30.09 KG/M2

## 2024-09-09 DIAGNOSIS — M17.0 PRIMARY OSTEOARTHRITIS OF BOTH KNEES: Primary | ICD-10-CM

## 2024-09-09 PROCEDURE — 20610 DRAIN/INJ JOINT/BURSA W/O US: CPT | Performed by: NURSE PRACTITIONER

## 2024-09-09 NOTE — PROGRESS NOTES
9/9/2024    Esa Lozano is here today for worsening knee pain. Pt has undergone injection of the knee in the past with good resolution of symptoms. Pt is requesting a repeat injection.     KNEE Injection Procedure Note:    Large Joint Arthrocentesis: R knee  Date/Time: 9/9/2024 11:30 AM  Consent given by: patient  Site marked: site marked  Timeout: Immediately prior to procedure a time out was called to verify the correct patient, procedure, equipment, support staff and site/side marked as required   Supporting Documentation  Indications: pain and joint swelling   Procedure Details  Location: knee - R knee  Preparation: Patient was prepped and draped in the usual sterile fashion  Needle size: 22 G  Approach: anterolateral  Medications administered: 2 mL lidocaine (cardiac); 88 mg Hyaluronan 88 MG/4ML  Patient tolerance: patient tolerated the procedure well with no immediate complications      Large Joint Arthrocentesis: L knee  Date/Time: 9/9/2024 11:31 AM  Consent given by: patient  Site marked: site marked  Timeout: Immediately prior to procedure a time out was called to verify the correct patient, procedure, equipment, support staff and site/side marked as required   Supporting Documentation  Indications: pain and joint swelling   Procedure Details  Location: knee - L knee  Preparation: Patient was prepped and draped in the usual sterile fashion  Needle size: 22 G  Approach: anterolateral  Medications administered: 2 mL lidocaine (cardiac); 88 mg Hyaluronan 88 MG/4ML  Patient tolerance: patient tolerated the procedure well with no immediate complications      At the conclusion of the injection I discussed the importance of continued quad strengthening exercises on a daily basis. I will see the patient back if the symptoms should fail to improve or worsen.    Ngoc Pham, APRN  9/9/2024

## 2025-01-14 ENCOUNTER — OFFICE VISIT (OUTPATIENT)
Dept: INTERNAL MEDICINE | Facility: CLINIC | Age: 50
End: 2025-01-14
Payer: COMMERCIAL

## 2025-01-14 VITALS
BODY MASS INDEX: 31.78 KG/M2 | WEIGHT: 222 LBS | HEIGHT: 70 IN | HEART RATE: 89 BPM | OXYGEN SATURATION: 96 % | SYSTOLIC BLOOD PRESSURE: 122 MMHG | DIASTOLIC BLOOD PRESSURE: 86 MMHG

## 2025-01-14 DIAGNOSIS — K21.9 GASTROESOPHAGEAL REFLUX DISEASE WITHOUT ESOPHAGITIS: Chronic | ICD-10-CM

## 2025-01-14 DIAGNOSIS — K75.81 METABOLIC DYSFUNCTION-ASSOCIATED STEATOHEPATITIS (MASH): Primary | ICD-10-CM

## 2025-01-14 DIAGNOSIS — E78.2 HYPERLIPIDEMIA, MIXED: Chronic | ICD-10-CM

## 2025-01-14 DIAGNOSIS — G43.009 MIGRAINE WITHOUT AURA AND WITHOUT STATUS MIGRAINOSUS, NOT INTRACTABLE: Chronic | ICD-10-CM

## 2025-01-14 LAB
ALBUMIN SERPL-MCNC: 4.2 G/DL (ref 3.5–5.2)
ALBUMIN/GLOB SERPL: 1.7 G/DL
ALP SERPL-CCNC: 60 U/L (ref 39–117)
ALT SERPL-CCNC: 149 U/L (ref 1–41)
AST SERPL-CCNC: 103 U/L (ref 1–40)
BILIRUB SERPL-MCNC: 0.9 MG/DL (ref 0–1.2)
BUN SERPL-MCNC: 13 MG/DL (ref 6–20)
BUN/CREAT SERPL: 13.7 (ref 7–25)
CALCIUM SERPL-MCNC: 9.5 MG/DL (ref 8.6–10.5)
CHLORIDE SERPL-SCNC: 102 MMOL/L (ref 98–107)
CHOLEST SERPL-MCNC: 99 MG/DL (ref 0–200)
CHOLEST/HDLC SERPL: 2.61 {RATIO}
CO2 SERPL-SCNC: 27.2 MMOL/L (ref 22–29)
CREAT SERPL-MCNC: 0.95 MG/DL (ref 0.76–1.27)
EGFRCR SERPLBLD CKD-EPI 2021: 98.1 ML/MIN/1.73
GLOBULIN SER CALC-MCNC: 2.5 GM/DL
GLUCOSE SERPL-MCNC: 102 MG/DL (ref 65–99)
HDLC SERPL-MCNC: 38 MG/DL (ref 40–60)
LDLC SERPL CALC-MCNC: 41 MG/DL (ref 0–100)
POTASSIUM SERPL-SCNC: 4.7 MMOL/L (ref 3.5–5.2)
PROT SERPL-MCNC: 6.7 G/DL (ref 6–8.5)
SODIUM SERPL-SCNC: 139 MMOL/L (ref 136–145)
TRIGL SERPL-MCNC: 107 MG/DL (ref 0–150)
VLDLC SERPL CALC-MCNC: 20 MG/DL (ref 5–40)

## 2025-01-14 PROCEDURE — 99214 OFFICE O/P EST MOD 30 MIN: CPT | Performed by: STUDENT IN AN ORGANIZED HEALTH CARE EDUCATION/TRAINING PROGRAM

## 2025-01-14 RX ORDER — ATORVASTATIN CALCIUM 40 MG/1
40 TABLET, FILM COATED ORAL DAILY
Qty: 90 TABLET | Refills: 4 | Status: SHIPPED | OUTPATIENT
Start: 2025-01-14

## 2025-01-14 RX ORDER — RIZATRIPTAN BENZOATE 10 MG/1
10 TABLET ORAL ONCE AS NEEDED
Qty: 12 TABLET | Refills: 11 | Status: SHIPPED | OUTPATIENT
Start: 2025-01-14

## 2025-01-14 RX ORDER — LANSOPRAZOLE 30 MG/1
30 CAPSULE, DELAYED RELEASE ORAL DAILY
Qty: 90 CAPSULE | Refills: 4 | Status: SHIPPED | OUTPATIENT
Start: 2025-01-14

## 2025-01-14 NOTE — PROGRESS NOTES
"Chief Complaint  Establish Care and Hyperlipidemia    Subjective        Esa Lozano presents to [facility]  History of Present Illness  Presents to clinic today as a new patient to establish care, was previously following with Dr. Mcgrath    He has no acute complaints or concerns today other than refill on chronic medications.  Medical history is notable for nonalcoholic fatty liver disease, he previously established with GI and has had a workup that demonstrated S3 steatosis and F3 fibrosis, he was placed om Remisteron which he states he is almost out on.     History is notable for hyperlipidemia, which she is currently on Lipitor performed.  GERD which she is currently on lansoprazole for and migraines which she takes with his rizatriptan as needed      Objective   Vital Signs:  /86   Pulse 89   Ht 177.8 cm (70\")   Wt 101 kg (222 lb)   SpO2 96%   BMI 31.85 kg/m²   Estimated body mass index is 31.85 kg/m² as calculated from the following:    Height as of this encounter: 177.8 cm (70\").    Weight as of this encounter: 101 kg (222 lb).          Physical Exam  Vitals reviewed.   Constitutional:       General: He is not in acute distress.     Appearance: Normal appearance. He is not toxic-appearing.   HENT:      Head: Normocephalic and atraumatic.   Eyes:      General: No scleral icterus.     Conjunctiva/sclera: Conjunctivae normal.   Skin:     General: Skin is warm and dry.   Neurological:      Mental Status: He is alert and oriented to person, place, and time.   Psychiatric:         Mood and Affect: Mood normal.         Behavior: Behavior normal.        Result Review :                Assessment and Plan   Diagnoses and all orders for this visit:    1. Metabolic dysfunction-associated steatohepatitis (MASH) (Primary)  -     Comprehensive Metabolic Panel    2. Hyperlipidemia, mixed  -     Lipid Panel With / Chol / HDL Ratio  -     Comprehensive Metabolic Panel    3. " Gastroesophageal reflux disease without esophagitis  -     lansoprazole (PREVACID) 30 MG capsule; Take 1 capsule by mouth Daily. For GERD  Dispense: 90 capsule; Refill: 4    4. Migraine without aura and without status migrainosus, not intractable  -     rizatriptan (MAXALT) 10 MG tablet; Take 1 tablet by mouth 1 (One) Time As Needed for Migraine. May repeat in 2 hours if needed  Dispense: 12 tablet; Refill: 11      Reviewed previous PCP, gastroenterology office visit, labs dating back to 2023, outside [facility] imaging    It appears he has advanced fibrosis of suspected fatty liver, was noted to have S3 steatosis, F3 fibrosis on [facility] obtained about 6 months ago.  It appears he was unaware of the severity, he does have a history of celiac but does not have other contributing factors such as hypertension, diabetes.  I would like to repeat labs today, however we may need to get him established with a new GI physician to discuss his findings will continue surveillance of his fatty liver.  He is going to reach out to his previous gastroenterologist and see if he can reestablish with them.    For now, we will continue statin at 40 mg and will avoid going up on dose.  Will repeat cholesterol panel today    Other conditions are chronic and clinically stable, continue Prevacid for GERD, rizatriptan for migraine as needed    We will tentatively plan to follow-up in about 6 months however will obtain labs today           Follow Up   Return in about 6 months (around 7/14/2025).  Patient was given instructions and counseling regarding his condition or for health maintenance advice. Please see specific information pulled into the AVS if appropriate.

## 2025-01-16 ENCOUNTER — TELEPHONE (OUTPATIENT)
Dept: INTERNAL MEDICINE | Facility: CLINIC | Age: 50
End: 2025-01-16
Payer: COMMERCIAL

## 2025-01-16 NOTE — TELEPHONE ENCOUNTER
".        Hub staff attempted to follow warm transfer process and was unsuccessful     Caller: Esa Lozano \"Mitchel\"    Relationship to patient: Self    Best call back number: [phone]     Patient is needing: PATIENT IS RETURNING NICKY'S CALL ABOUT TEST RESULTS.  "

## 2025-01-24 ENCOUNTER — TELEPHONE (OUTPATIENT)
Dept: ORTHOPEDIC SURGERY | Facility: CLINIC | Age: 50
End: 2025-01-24

## 2025-01-24 NOTE — TELEPHONE ENCOUNTER
"Caller: Esa Lozano \"Mitchel\"    Relationship to patient: Self    Best call back number: [phone]    Type of visit: ANN MARIE KNEE GEL INJECTIONS    Requested date: ASAP WITH ERLIN ROSE     "

## 2025-03-12 ENCOUNTER — OFFICE (AMBULATORY)
Dept: URBAN - METROPOLITAN AREA CLINIC 76 | Facility: CLINIC | Age: 50
End: 2025-03-12
Payer: COMMERCIAL

## 2025-03-12 VITALS
SYSTOLIC BLOOD PRESSURE: 138 MMHG | WEIGHT: 209 LBS | OXYGEN SATURATION: 96 % | DIASTOLIC BLOOD PRESSURE: 84 MMHG | HEART RATE: 78 BPM | HEIGHT: 70 IN

## 2025-03-12 DIAGNOSIS — R10.30 LOWER ABDOMINAL PAIN, UNSPECIFIED: ICD-10-CM

## 2025-03-12 DIAGNOSIS — R93.3 ABNORMAL FINDINGS ON DIAGNOSTIC IMAGING OF OTHER PARTS OF DI: ICD-10-CM

## 2025-03-12 DIAGNOSIS — R74.8 ABNORMAL LEVELS OF OTHER SERUM ENZYMES: ICD-10-CM

## 2025-03-12 DIAGNOSIS — K29.70 GASTRITIS, UNSPECIFIED, WITHOUT BLEEDING: ICD-10-CM

## 2025-03-12 DIAGNOSIS — R19.5 OTHER FECAL ABNORMALITIES: ICD-10-CM

## 2025-03-12 DIAGNOSIS — F45.8 OTHER SOMATOFORM DISORDERS: ICD-10-CM

## 2025-03-12 DIAGNOSIS — Z86.0101 PERSONAL HISTORY OF ADENOMATOUS AND SERRATED COLON POLYPS: ICD-10-CM

## 2025-03-12 DIAGNOSIS — K90.0 CELIAC DISEASE: ICD-10-CM

## 2025-03-12 DIAGNOSIS — Z11.59 ENCOUNTER FOR SCREENING FOR OTHER VIRAL DISEASES: ICD-10-CM

## 2025-03-12 DIAGNOSIS — K31.9 DISEASE OF STOMACH AND DUODENUM, UNSPECIFIED: ICD-10-CM

## 2025-03-12 DIAGNOSIS — K21.9 GASTRO-ESOPHAGEAL REFLUX DISEASE WITHOUT ESOPHAGITIS: ICD-10-CM

## 2025-03-12 PROBLEM — K63.5 POLYP OF COLON: Status: ACTIVE | Noted: 2020-07-28

## 2025-03-12 PROCEDURE — 99214 OFFICE O/P EST MOD 30 MIN: CPT | Performed by: INTERNAL MEDICINE

## 2025-03-12 NOTE — SERVICEHPINOTES
Mister Thomas is here for follow-up.  He does have occasional right-sided pain but it sounds like it's more musculoskeletal.  He has a history of Loco with S3 steatosis and F3 fibrosis.  He was concern that the liver was causing the pain.  I assured him that the liver would not cause these symptoms.  Again it sounds like it's musculoskeletal.
br
wilma He has lost some weight.  He used to weigh 225.  One he got down to 199.  He says now he is around 208 or 209.  He was started on Rezdiffra, I want to update his blood work today.  He knows to continue to focus on weight loss.
br
br He does have celiac disease.  He is on gluten-free diet.  Occasionally he'll get some change in bowel habits.  This may be related to inadvertently getting gluten in the diet.  For the most part his bowels are regular.  He does have a history of polyps.  He is due for colonoscopy in July.
br
wilma He also has reflux.  He does well unless he forgets his medicine or eats late.  There is no dysphagia, odynophagia nausea vomiting melena or hematemesis.  He is in no acute distress..

## 2025-03-21 ENCOUNTER — CLINICAL SUPPORT (OUTPATIENT)
Dept: ORTHOPEDIC SURGERY | Facility: CLINIC | Age: 50
End: 2025-03-21
Payer: COMMERCIAL

## 2025-03-21 VITALS — WEIGHT: 216.5 LBS | BODY MASS INDEX: 30.99 KG/M2 | HEIGHT: 70 IN | TEMPERATURE: 97.9 F

## 2025-03-21 DIAGNOSIS — M17.0 PRIMARY OSTEOARTHRITIS OF BOTH KNEES: Primary | ICD-10-CM

## 2025-03-21 NOTE — PROGRESS NOTES
3/21/2025    Esa Lozano is here today for worsening knee pain. Pt has undergone injection of the knee in the past with good resolution of symptoms. Pt is requesting a repeat injection.     KNEE Injection Procedure Note:    Large Joint Arthrocentesis: R knee  Date/Time: 3/21/2025 3:22 PM  Consent given by: patient  Site marked: site marked  Timeout: Immediately prior to procedure a time out was called to verify the correct patient, procedure, equipment, support staff and site/side marked as required   Supporting Documentation  Indications: pain and joint swelling   Procedure Details  Location: knee - R knee  Preparation: Patient was prepped and draped in the usual sterile fashion  Needle size: 22 G  Approach: anterolateral  Medications administered: 2 mL lidocaine (cardiac); 88 mg Hyaluronan 88 MG/4ML  Patient tolerance: patient tolerated the procedure well with no immediate complications      Large Joint Arthrocentesis: L knee  Date/Time: 3/21/2025 3:22 PM  Consent given by: patient  Site marked: site marked  Timeout: Immediately prior to procedure a time out was called to verify the correct patient, procedure, equipment, support staff and site/side marked as required   Supporting Documentation  Indications: pain and joint swelling   Procedure Details  Location: knee - L knee  Preparation: Patient was prepped and draped in the usual sterile fashion  Needle size: 22 G  Approach: anterolateral  Medications administered: 2 mL lidocaine (cardiac); 88 mg Hyaluronan 88 MG/4ML  Patient tolerance: patient tolerated the procedure well with no immediate complications      At the conclusion of the injection I discussed the importance of continued quad strengthening exercises on a daily basis. I will see the patient back if the symptoms should fail to improve or worsen.    Ngoc Pahm, APRN  3/21/2025

## 2025-07-15 ENCOUNTER — OFFICE VISIT (OUTPATIENT)
Dept: INTERNAL MEDICINE | Facility: CLINIC | Age: 50
End: 2025-07-15
Payer: COMMERCIAL

## 2025-07-15 VITALS
HEIGHT: 70 IN | DIASTOLIC BLOOD PRESSURE: 89 MMHG | WEIGHT: 217 LBS | HEART RATE: 76 BPM | OXYGEN SATURATION: 96 % | BODY MASS INDEX: 31.07 KG/M2 | SYSTOLIC BLOOD PRESSURE: 138 MMHG

## 2025-07-15 DIAGNOSIS — R03.0 ELEVATED BLOOD PRESSURE READING WITHOUT DIAGNOSIS OF HYPERTENSION: ICD-10-CM

## 2025-07-15 DIAGNOSIS — K21.9 GASTROESOPHAGEAL REFLUX DISEASE WITHOUT ESOPHAGITIS: Chronic | ICD-10-CM

## 2025-07-15 DIAGNOSIS — K75.81 METABOLIC DYSFUNCTION-ASSOCIATED STEATOHEPATITIS (MASH): Primary | ICD-10-CM

## 2025-07-15 DIAGNOSIS — R73.01 IFG (IMPAIRED FASTING GLUCOSE): Chronic | ICD-10-CM

## 2025-07-15 DIAGNOSIS — Z00.00 WELLNESS EXAMINATION: ICD-10-CM

## 2025-07-15 DIAGNOSIS — E78.2 HYPERLIPIDEMIA, MIXED: ICD-10-CM

## 2025-07-15 RX ORDER — MULTIVIT-MIN/FOLIC ACID/LUTEIN 400-250MCG
TABLET,CHEWABLE ORAL
COMMUNITY

## 2025-07-15 RX ORDER — CHLORAL HYDRATE 500 MG
CAPSULE ORAL
COMMUNITY

## 2025-07-15 NOTE — PROGRESS NOTES
"Chief Complaint  fatty liver    Subjective        Mallardluiz Lozano presents to [facility]  History of Present Illness    Presents to clinic today for follow up    He is doing relatively well since last visit, has no acute complaints or concerns. He recently saw GI within past 6 months, and is taking rezdiffra without complications. He has labs obtained at GI office visit and reports liver numbers are stable    His GERD and migraines are relatively well controlled as well on prevacid and rizatriptan prn     Objective   Vital Signs:  /89   Pulse 76   Ht 177.8 cm (70\")   Wt 98.4 kg (217 lb)   SpO2 96%   BMI 31.14 kg/m²   Estimated body mass index is 31.14 kg/m² as calculated from the following:    Height as of this encounter: 177.8 cm (70\").    Weight as of this encounter: 98.4 kg (217 lb).          Physical Exam  Vitals reviewed.   Constitutional:       General: He is not in acute distress.     Appearance: Normal appearance. He is obese. He is not toxic-appearing.   HENT:      Head: Normocephalic and atraumatic.   Eyes:      General: No scleral icterus.     Conjunctiva/sclera: Conjunctivae normal.   Skin:     General: Skin is warm and dry.   Neurological:      Mental Status: He is alert and oriented to person, place, and time.   Psychiatric:         Mood and Affect: Mood normal.         Behavior: Behavior normal.        Result Review :                Assessment and Plan   Diagnoses and all orders for this visit:    1. Metabolic dysfunction-associated steatohepatitis (MASH) (Primary)  -     Comprehensive Metabolic Panel; Future    2. Hyperlipidemia, mixed  -     Lipid Panel With / Chol / HDL Ratio; Future    3. IFG (impaired fasting glucose)  -     Hemoglobin A1c; Future    4. Gastroesophageal reflux disease without esophagitis  -     CBC & Differential; Future    5. Elevated blood pressure reading without diagnosis of hypertension    6. Wellness examination  -     " Comprehensive Metabolic Panel; Future  -     Lipid Panel With / Chol / HDL Ratio; Future  -     CBC & Differential; Future  -     Hemoglobin A1c; Future      Reviewed GI OV, labs obtained at this visit with GI    I did strongly recommend he increase his physical activity with goal to lose weight, ideally 5-10% over next 6mo for management of his MASLD. He will try to increase exercise but reports is limited due to his knee OA and has follow up with orthopedic surgery    Continue f/u with GI for MASLD and rezdiffra     His BP is slightly up today and I really would like to keep eye on this given his comorbidities. He will obtain a home BP cuff and check 1-2x/month RTC sooner if persistently >140/90    RTC in 6mo for CPE, labs prior          Follow Up   Return in about 6 months (around 1/15/2026) for Annual physical, Lab before FU.  Patient was given instructions and counseling regarding his condition or for health maintenance advice. Please see specific information pulled into the AVS if appropriate.

## 2025-07-21 ENCOUNTER — TELEPHONE (OUTPATIENT)
Dept: ORTHOPEDIC SURGERY | Facility: CLINIC | Age: 50
End: 2025-07-21
Payer: COMMERCIAL

## 2025-07-21 NOTE — TELEPHONE ENCOUNTER
"Caller: Esa Lozano \"Mitchel\"    Relationship to patient: Self    Best call back number: [phone]    Chief complaint: BILATERAL KNEES    Type of visit: DISCUSS BILATERAL KNEE REPLACEMENT SURGERY     Requested date: ASAP      Additional notes:PATIENT SCHEDULED FIRST AVAILABLE  09/18/2025- WANTED TO SEE - WANTS TO BE SEEN SOONER IF POSSIBLE     "

## 2025-08-14 VITALS
DIASTOLIC BLOOD PRESSURE: 87 MMHG | RESPIRATION RATE: 24 BRPM | HEART RATE: 79 BPM | RESPIRATION RATE: 20 BRPM | SYSTOLIC BLOOD PRESSURE: 122 MMHG | DIASTOLIC BLOOD PRESSURE: 82 MMHG | SYSTOLIC BLOOD PRESSURE: 142 MMHG | OXYGEN SATURATION: 96 % | OXYGEN SATURATION: 91 % | DIASTOLIC BLOOD PRESSURE: 75 MMHG | RESPIRATION RATE: 27 BRPM | DIASTOLIC BLOOD PRESSURE: 88 MMHG | SYSTOLIC BLOOD PRESSURE: 143 MMHG | OXYGEN SATURATION: 98 % | RESPIRATION RATE: 23 BRPM | DIASTOLIC BLOOD PRESSURE: 91 MMHG | TEMPERATURE: 97.8 F | RESPIRATION RATE: 16 BRPM | SYSTOLIC BLOOD PRESSURE: 131 MMHG | SYSTOLIC BLOOD PRESSURE: 136 MMHG | HEART RATE: 80 BPM | DIASTOLIC BLOOD PRESSURE: 79 MMHG | HEART RATE: 70 BPM | RESPIRATION RATE: 18 BRPM | HEART RATE: 78 BPM | TEMPERATURE: 97.6 F | SYSTOLIC BLOOD PRESSURE: 132 MMHG | SYSTOLIC BLOOD PRESSURE: 165 MMHG | OXYGEN SATURATION: 100 % | OXYGEN SATURATION: 97 % | SYSTOLIC BLOOD PRESSURE: 121 MMHG | HEIGHT: 70 IN | HEART RATE: 73 BPM | HEART RATE: 81 BPM | WEIGHT: 202 LBS | RESPIRATION RATE: 21 BRPM | RESPIRATION RATE: 14 BRPM | HEART RATE: 101 BPM

## 2025-08-17 PROBLEM — Z86.010 SURVEILLANCE DUE TO PRIOR COLONIC NEOPLASIA: Status: ACTIVE | Noted: 2025-08-18

## 2025-08-18 ENCOUNTER — AMBULATORY SURGICAL CENTER (AMBULATORY)
Dept: URBAN - METROPOLITAN AREA SURGERY 17 | Facility: SURGERY | Age: 50
End: 2025-08-18
Payer: COMMERCIAL

## 2025-08-18 ENCOUNTER — OFFICE (AMBULATORY)
Dept: URBAN - METROPOLITAN AREA PATHOLOGY 4 | Facility: PATHOLOGY | Age: 50
End: 2025-08-18
Payer: COMMERCIAL

## 2025-08-18 DIAGNOSIS — Z86.0101 PERSONAL HISTORY OF ADENOMATOUS AND SERRATED COLON POLYPS: ICD-10-CM

## 2025-08-18 DIAGNOSIS — Z09 ENCOUNTER FOR FOLLOW-UP EXAMINATION AFTER COMPLETED TREATMEN: ICD-10-CM

## 2025-08-18 DIAGNOSIS — K63.5 POLYP OF COLON: ICD-10-CM

## 2025-08-18 DIAGNOSIS — D12.3 BENIGN NEOPLASM OF TRANSVERSE COLON: ICD-10-CM

## 2025-08-18 LAB
GI HISTOLOGY: A. TRANSVERSE COLON: (no result)
GI HISTOLOGY: B. HEPATIC FLEXURE: (no result)
GI HISTOLOGY: C. SIGMOID COLON: (no result)
GI HISTOLOGY: PDF REPORT: (no result)

## 2025-08-18 PROCEDURE — 88305 TISSUE EXAM BY PATHOLOGIST: CPT | Performed by: PATHOLOGY

## 2025-08-18 PROCEDURE — 45385 COLONOSCOPY W/LESION REMOVAL: CPT | Mod: 33 | Performed by: INTERNAL MEDICINE

## (undated) DEVICE — ENCORE® LATEX ORTHO SIZE 8, STERILE LATEX POWDER-FREE SURGICAL GLOVE: Brand: ENCORE

## (undated) DEVICE — SKIN PREP TRAY W/CHG: Brand: MEDLINE INDUSTRIES, INC.

## (undated) DEVICE — TBG PUMP ARTHSCP MAIN AR6400 16FT

## (undated) DEVICE — BNDG ELAS ELITE V/CLOSE 6IN 5YD LF STRL

## (undated) DEVICE — DRAPE,REIN 53X77,STERILE: Brand: MEDLINE

## (undated) DEVICE — TUBING, SUCTION, 1/4" X 20', STRAIGHT: Brand: MEDLINE INDUSTRIES, INC.

## (undated) DEVICE — SPNG GZ WOVN 4X4IN 12PLY 10/BX STRL

## (undated) DEVICE — SUPER MULTIVAC 50 WITH INTEGRATED                                    FINGER SWITCHES IFS: Brand: COBLATION

## (undated) DEVICE — APPL CHLORAPREP W/TINT 26ML ORNG

## (undated) DEVICE — BLD SHVE RESEC COOLCT SABRE CRV 4MM 13CM

## (undated) DEVICE — DISPOSABLE TOURNIQUET CUFF SINGLE BLADDER, SINGLE PORT AND QUICK CONNECT CONNECTOR: Brand: COLOR CUFF

## (undated) DEVICE — UNDERCAST PADDING: Brand: DEROYAL

## (undated) DEVICE — OCCLUSIVE GAUZE STRIP,3% BISMUTH TRIBROMOPHENATE IN PETROLATUM BLEND: Brand: XEROFORM

## (undated) DEVICE — SUT ETHLN 4/0 PS2 PLSTC 1667G

## (undated) DEVICE — PK ARTHSCP 40

## (undated) DEVICE — BNDG ELAS ELITE V/CLOSE 4IN 5YD LF STRL

## (undated) DEVICE — GLV SURG TRIUMPH CLASSIC PF LTX 8 STRL

## (undated) DEVICE — INTENDED FOR TISSUE SEPARATION, AND OTHER PROCEDURES THAT REQUIRE A SHARP SURGICAL BLADE TO PUNCTURE OR CUT.: Brand: BARD-PARKER ® CARBON RIB-BACK BLADES